# Patient Record
Sex: MALE | Race: WHITE | Employment: OTHER
[De-identification: names, ages, dates, MRNs, and addresses within clinical notes are randomized per-mention and may not be internally consistent; named-entity substitution may affect disease eponyms.]

---

## 2017-04-18 PROBLEM — R35.1 NOCTURIA: Status: ACTIVE | Noted: 2017-04-18

## 2018-04-10 PROBLEM — M79.2 NEUROPATHIC PAIN OF LEFT FOOT: Status: ACTIVE | Noted: 2018-04-10

## 2020-12-11 ENCOUNTER — TELEPHONE (OUTPATIENT)
Dept: RADIATION ONCOLOGY | Facility: MEDICAL CENTER | Age: 85
End: 2020-12-11

## 2020-12-11 NOTE — TELEPHONE ENCOUNTER
Received consult request from Dr Jovita Watson. Patient has [employer] and they are out of network. I notified Francisco Javier Lee at Dr Price Farris office.

## 2022-02-08 ENCOUNTER — TELEPHONE (OUTPATIENT)
Dept: GASTROENTEROLOGY | Age: 87
End: 2022-02-08

## 2022-02-08 DIAGNOSIS — K92.2 GASTROINTESTINAL HEMORRHAGE, UNSPECIFIED GASTROINTESTINAL HEMORRHAGE TYPE: Primary | ICD-10-CM

## 2022-02-08 NOTE — TELEPHONE ENCOUNTER
Writer called the patient's wife and let her doctor's recommendations. Writer will fax the labs to [facility] and will route this message to the Procedure Schedulers to schedule an EGD for the patient at [facility].

## 2022-02-08 NOTE — TELEPHONE ENCOUNTER
Patient's wife called and stated that the patient was in the hospital at [facility] [place], [place] on 1/27 - 1/30/22 due to Upper GI Bleed. Caller stated that an EGD was performed. Caller stated the results were seen on My Chart and it read that patient should repeat EGD. Caller wanted to know if the repeat EGD should be done before patient's next visit on 2/18/22. Writer stated that she will consult with doctor and get his recommendations and call her back. Caller verbalized understanding and thanked the writer.

## 2022-02-09 NOTE — TELEPHONE ENCOUNTER
Spoke with Dr Suman Persaud; he states there is no urgency for the EGD and pt can be scheduled next time he is at [facility]. Called pt; wife answered; pt is now scheduled for B[facility] Dr Suman Persaud 4/7/22 at 915am, egd, em, covid test 4/4/22 at 1130am. Reviewed prep instructions with patient over phone and mailed to home address.

## 2022-02-10 ENCOUNTER — OFFICE VISIT (OUTPATIENT)
Dept: GASTROENTEROLOGY | Age: 87
End: 2022-02-10
Payer: COMMERCIAL

## 2022-02-10 VITALS
DIASTOLIC BLOOD PRESSURE: 75 MMHG | HEART RATE: 62 BPM | TEMPERATURE: 96.7 F | SYSTOLIC BLOOD PRESSURE: 135 MMHG | OXYGEN SATURATION: 98 % | BODY MASS INDEX: 28.05 KG/M2 | WEIGHT: 179.1 LBS

## 2022-02-10 DIAGNOSIS — R10.84 ABDOMINAL PAIN, GENERALIZED: ICD-10-CM

## 2022-02-10 DIAGNOSIS — K31.7 GASTRIC POLYP: Primary | ICD-10-CM

## 2022-02-10 DIAGNOSIS — K59.00 CONSTIPATION, UNSPECIFIED CONSTIPATION TYPE: ICD-10-CM

## 2022-02-10 PROCEDURE — 99214 OFFICE O/P EST MOD 30 MIN: CPT | Performed by: INTERNAL MEDICINE

## 2022-02-10 ASSESSMENT — ENCOUNTER SYMPTOMS
ABDOMINAL DISTENTION: 0
CHOKING: 0
NAUSEA: 0
VOMITING: 0
COUGH: 0
TROUBLE SWALLOWING: 0
BLOOD IN STOOL: 0
CONSTIPATION: 0
SORE THROAT: 0
ANAL BLEEDING: 0
SHORTNESS OF BREATH: 0
VOICE CHANGE: 0
ABDOMINAL PAIN: 1
RECTAL PAIN: 0
BACK PAIN: 0
DIARRHEA: 0

## 2022-02-10 NOTE — PROGRESS NOTES
GI OFFICE FOLLOW UP    INTERVAL HISTORY:   No referring provider defined for this encounter. Chief Complaint   Patient presents with    Abdominal Pain     Patient is here today due to stomach pain       1. Gastric polyp    2. Constipation, unspecified constipation type    3. Abdominal pain, generalized              HISTORY OF PRESENT ILLNESS: Andreea Jurado is a 80 y.o. male with a past history remarkable for ,   Patient seen along with his wife. Apparently this patient was admitted but [facility] recently with dark stools. His hemoglobin is around 14 g and did not drop. Apparently patient was in the hospital for 3 to 4 days. He had a EGD done and was found to have a polypoid lesion, 1 cm in the body of the stomach which was biopsied. Histology revealed lymphocytic infiltration. Patient tolerating diet well. No nausea vomiting. No dysphagia. Has vague nonspecific upper abdominal discomfort. No radiation. Not related to food intake. No fever chills. Apparently patient had MRI of the abdomen done when he was in the hospital and was nonspecific. Patient states that his upper abdominal discomfort appears to be mostly when he sits for long time. When he is staying in the bed he does not have any symptoms. Past Medical,Family, and Social History reviewed and does contribute to the patient presenting condition. Patient's PMH/PSH,SH,PSYCH Hx, MEDs, ALLERGIES, and ROS were all reviewed and updated in the appropriate sections.  Yes      PAST MEDICAL HISTORY:  Past Medical History:   Diagnosis Date    Bilateral edema of lower extremity 1995    Bilateral hearing loss     wears hearing aids    Diverticulosis of sigmoid colon     Gout     History of bladder cancer 1980    Dr. Preet Villasenor yearly cysto    History of colon polyps 09/10/2012    History of hypokalemia  History of melanoma 2008    left cheek    History of prostate cancer 1992    S/P Prostatectomy    Hyperlipidemia     Hypertension     Tubular adenoma of colon 09/10/2012       Past Surgical History:   Procedure Laterality Date    COLONOSCOPY  2005    dr Joe Pierre COLONOSCOPY  09/10/2012    significant diverticulosis, tubular adenoma-sigmoid colon, small hemorrhoids    COLONOSCOPY  10/03/2016    significant diverticulosis sigmoid colon, small polyp rectum-hyperplastic    EYE SURGERY Bilateral 1994    cataracts removed    HERNIA REPAIR Right 1956    inguinal    HERNIA REPAIR Left 1995    inguinal    JOINT REPLACEMENT Right 1984    great toe joint D/T gout    KNEE ARTHROPLASTY Left 2000    meniscal repair    PROSTATE SURGERY  2002       CURRENT MEDICATIONS:    Current Outpatient Medications:     atorvastatin (LIPITOR) 20 MG tablet, Take 1 tablet by mouth daily, Disp: 90 tablet, Rfl: 1    gabapentin (NEURONTIN) 100 MG capsule, Take 1 capsule at night, Disp: 90 capsule, Rfl: 1    potassium chloride (KLOR-CON M) 20 MEQ extended release tablet, Take 1 tablet by mouth daily, Disp: 90 tablet, Rfl: 1    lisinopril (PRINIVIL;ZESTRIL) 40 MG tablet, Take 1 tablet by mouth daily, Disp: 90 tablet, Rfl: 1    allopurinol (ZYLOPRIM) 300 MG tablet, Take 1 tablet by mouth daily, Disp: 90 tablet, Rfl: 1    timolol (TIMOPTIC) 0.5 % ophthalmic solution, Place 1 drop into the left eye daily, Disp: 3 each, Rfl: 1    olopatadine (PATANASE) 0.6 % SOLN nassl soln, SPRAY 2 SPRAY(S) IN EACH NOSTRIL NIGHTLY, Disp: 3 each, Rfl: 1    hydroCHLOROthiazide (HYDRODIURIL) 25 MG tablet, Take 1 tablet by mouth daily, Disp: 90 tablet, Rfl: 1    ipratropium (ATROVENT) 0.03 % nasal spray, 2 sprays by Nasal route 3 times daily, Disp: 3 each, Rfl: 1    latanoprost (XALATAN) 0.005 % ophthalmic solution, INSTILL 1 DROP INTO EACH EYE NIGHTLY, Disp: 3 each, Rfl: 1    lidocaine 4 % external patch, Place 1 patch onto the skin daily, Disp: 30 patch, Rfl: 2    potassium chloride (KLOR-CON M) 10 MEQ extended release tablet, Take 1 tablet by mouth 2 times daily (Patient not taking: Reported on 1/31/2022), Disp: 30 tablet, Rfl: 0    magnesium oxide (MAG-OX) 400 MG tablet, Take 1 tablet by mouth daily, Disp: 30 tablet, Rfl: 0    meloxicam (MOBIC) 15 MG tablet, Take 1 tablet by mouth daily (Patient not taking: Reported on 1/31/2022), Disp: 90 tablet, Rfl: 1    tamsulosin (FLOMAX) 0.4 MG capsule, Take 1 capsule by mouth daily (Patient not taking: Reported on 7/7/2021), Disp: 90 capsule, Rfl: 3    ALLERGIES:   Allergies   Allergen Reactions    Mirabegron      Elevated blood pressure       FAMILY HISTORY:       Problem Relation Age of Onset    Heart Attack Mother     Heart Attack Father          SOCIAL HISTORY:   Social History     Socioeconomic History    Marital status:      Spouse name: Not on file    Number of children: Not on file    Years of education: Not on file    Highest education level: Not on file   Occupational History    Not on file   Tobacco Use    Smoking status: Never Smoker    Smokeless tobacco: Never Used   Vaping Use    Vaping Use: Never used   Substance and Sexual Activity    Alcohol use: Yes     Alcohol/week: 10.0 standard drinks     Types: 10 Shots of liquor per week    Drug use: No    Sexual activity: Not on file   Other Topics Concern    Not on file   Social History Narrative    Not on file     Social Determinants of Health     Financial Resource Strain: Low Risk     Difficulty of Paying Living Expenses: Not hard at all   Food Insecurity: No Food Insecurity    Worried About [address] in the Last Year: Never true    [address]  in the Last Year: Never true   Transportation Needs:     Lack of Transportation (Medical): Not on file    Lack of Transportation (Non-Medical):  Not on file   Physical Activity:     Days of Exercise per Week: Not on file    Minutes of Exercise per Session: Not on file   Stress:     Feeling of Stress : Not on file   Social Connections:     Frequency of Communication with Friends and Family: Not on file    Frequency of Social Gatherings with Friends and Family: Not on file    Attends [employer] Services: Not on file    Active Member of Clubs or Organizations: Not on file    Attends Club or Organization Meetings: Not on file    Marital Status: Not on file   Intimate Partner Violence:     Fear of Current or Ex-Partner: Not on file    Emotionally Abused: Not on file    Physically Abused: Not on file    Sexually Abused: Not on file   Housing Stability:     Unable to Pay for Housing in the Last Year: Not on file    Number of [place] in the Last Year: Not on file    Unstable Housing in the Last Year: Not on file         REVIEW OF SYSTEMS:         Review of Systems   Constitutional: Negative for appetite change, fatigue and unexpected weight change. HENT: Negative for dental problem, sore throat, trouble swallowing and voice change. Eyes: Negative for visual disturbance. Respiratory: Negative for cough, choking and shortness of breath. Cardiovascular: Negative for chest pain and leg swelling. Gastrointestinal: Positive for abdominal pain. Negative for abdominal distention, anal bleeding, blood in stool, constipation, diarrhea, nausea, rectal pain and vomiting. Genitourinary: Negative for difficulty urinating. Musculoskeletal: Positive for gait problem (uses a cane). Negative for back pain, joint swelling and myalgias. Neurological: Negative for dizziness, tremors, weakness, light-headedness, numbness and headaches. Hematological: Does not bruise/bleed easily (bruises). Psychiatric/Behavioral: Negative for sleep disturbance. The patient is not nervous/anxious. PHYSICAL EXAMINATION:     Vital signs reviewed per the nursing documentation.      /75   Pulse 62   Temp 96.7 °F (35.9 °C)   Wt 179 lb 1.6 oz (81.2 kg)   SpO2 98%   BMI 28.05 kg/m²   Body mass index is 28.05 kg/m². Physical Exam  Vitals reviewed. Constitutional:       Appearance: Normal appearance. HENT:      Head: Normocephalic and atraumatic. Eyes:      Extraocular Movements: Extraocular movements intact. Conjunctiva/sclera: Conjunctivae normal.   Cardiovascular:      Rate and Rhythm: Normal rate and regular rhythm. Heart sounds: Normal heart sounds. Pulmonary:      Effort: Pulmonary effort is normal.      Breath sounds: Normal breath sounds. Abdominal:      General: Bowel sounds are normal. There is no distension. Palpations: Abdomen is soft. There is no mass. Tenderness: There is no abdominal tenderness. There is no guarding. Hernia: No hernia is present. Genitourinary:     Rectum: Guaiac result negative. Skin:     General: Skin is warm and dry. Neurological:      General: No focal deficit present. Mental Status: He is alert and oriented to person, place, and time.    Psychiatric:         Mood and Affect: Mood normal.         Behavior: Behavior normal.           LABORATORY DATA: Reviewed  Lab Results   Component Value Date    WBC 8.1 07/23/2020    HGB 16.1 07/23/2020    HCT 46.9 07/23/2020    MCV 97 07/23/2020     07/23/2020     08/27/2021    K 3.7 08/27/2021    CL 99 08/27/2021    CO2 25 08/27/2021    BUN 15 08/27/2021    CREATININE 1.00 08/27/2021    LABALBU 3.6 08/27/2021    BILITOT 1.5 (A) 08/27/2021    ALKPHOS 173 08/27/2021    AST 18 08/27/2021    ALT 13 08/27/2021         Lab Results   Component Value Date    RBC 4.83 07/23/2020    HGB 16.1 07/23/2020    MCV 97 07/23/2020    MCH 33.3 07/23/2020    MCHC 34.2 07/23/2020    RDW 13.6 07/23/2020    MPV 8 07/23/2020    BASOPCT 0.0 07/23/2020    LYMPHSABS 1.2 07/23/2020    MONOSABS 0.8 07/23/2020    NEUTROABS 5.9 07/23/2020    EOSABS 0.2 07/23/2020    BASOSABS 0.0 07/23/2020         DIAGNOSTIC TESTING:     Op Note - Jacob Weir MD - 01/30/2022 11:03 AM EST  Formatting of this note might be different from the original.    PROCEDURE NOTE    DATE OF PROCEDURE: 1/30/2022     SURGEON: Colleen Sorenson MD    ASSISTANT: None    PREOPERATIVE DIAGNOSIS:   Melena    POSTOPERATIVE DIAGNOSIS:   1 small polyp in the upper stomach gentle biopsy was taken might need to be removed in a different occasion with a snare, has a little erosion on the tip of it measuring less than 1 cm    Mild gastritis    Significant duodenitis    OPERATION: Upper GI endoscopy with Biopsy    ANESTHESIA: Moderate Sedation     ESTIMATED BLOOD LOSS: Less than 50 ml    COMPLICATIONS: None. SPECIMENS: was obtained    HISTORY: The patient is a 80y.o. year old male with history of above preop diagnosis. I recommended esophagogastroduodenoscopy with possible biopsy and I explained the risk, benefits, expected outcome, and alternatives to the procedure. Risks included but are not limited to bleeding, infection, respiratory distress, hypotension, and perforation of the esophagus, stomach, or duodenum. Patient understands and is in agreement. Informed consent was obtained for the procedure, including sedation. Risks of perforation, hemorrhage, adverse drug reaction and aspiration were discussed. The patient was placed in the left lateral decubitus position. Based on the pre-procedure assessment, including review of the patient's medical history, medications, allergies, and review of systems, he had been deemed to be an appropriate candidate for conscious sedation; he was therefore sedated with the medications listed below. The patient was monitored continuously with ECG tracing, pulse oximetry, blood pressure monitoring, and direct observations. PROCEDURE: The patient was given IV conscious sedation. The patient's SPO2 remained above 90% throughout the procedure.  The gastroscope was inserted orally and advanced under direct vision through the esophagus, through the stomach, through the pylorus, and into the descending duodenum. Findings:    Retropharyngeal area was grossly normal appearing    Esophagus: normal    Stomach:  Fundus: normal  Body: abnormal: 1 small polyp in the upper stomach gentle biopsy was taken might need to be removed in a different occasion with a snare, has a little erosion on the tip of it measuring less than 1 cm    Antrum:Mild gastritis    Duodenum:   Descending: normal  Bulb: abnormal: Significant duodenitis    The scope was removed and the patient tolerated the procedure well. Recommendations/Plan:   1. F/U Biopsies  2. PPI  3. Ok to d/c and follow out pt  4. will consider out pt colonoscopy     Electronically signed by Anayeli Draper MD on 2022 at 11:19 AM     Electronically signed by Anayeli Draper MD at 2022 11:20 AM EST      Narrative  Performed by [facility]   [facility]        [address] [place] [address]   Tel: [phone]         Fax: [phone]     [address] [place] [address]   Surgical Pathology Consultation            Patient Name: Jim ROSARIOGaye BUNCH : 9/3/1933 (Age: 80) Gender: M Taken: 2022 Reported: 2022 Physician(s): Anayeli Draper MD ([phone]) Copy To:  Accession #: [id number] [facility]. Rec. #: [id number] Acct: # [de-identified]   Final Pathologic Diagnosis   Upper stomach polyp, biopsy:        Fragments of unremarkable gastric mucosa with focal atypical intramucosal and submucosal B-cell             predominant lymphoid infiltrate (see comments).      No Helicobacter pylori organisms seen on H&E or immunostain. Comment: Sections show fragments of benign gastric mucosa with foci of submucosal and intramucosal lymphoid infiltrate composed predominantly of small uniform lymphocytes. Immunostains are performed with appropriate controls. The lymphoid infiltrate   shows diffuse positive staining for CD20. CD3, CD5 and CD43 stain background T-cells. CD10 and BCL 6 and are negative.  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 Repeat biopsy with flow cytometric analysis is   suggested.              **Report Electronically Signed Out**   nxk/2/2/2022 Talita Watts MD         Interpretation performed at [facility], [address] [facility], [place], [facility], License number: [id number]. Clinical History   GI bleed. Gross Description   Received in formalin labeled \"UHRMAN, upper stomach polyp\" are two tan soft tissue bits 0.4 and 0.6 cm. The specimen is filtered and entirely submitted in a single cassette. (1, ns, [id number],m8) KS       ks/1/31/2022 SSI         Specimen(s) Received    Upper stomach polyp     Fee Codes(s):   1; [id number], 74016, 40094(6)  Specimen Collected: 01/30/22 11:13 AM Last Resulted: 02/02/22 11:02 AM   Received From: [facility]  Result Received: 02/08/22  2:20 PM                   Assessment  1. Gastric polyp    2. Constipation, unspecified constipation type    3. Abdominal pain, generalized        Plan  Discussed with the patient and wife regarding benign abdomen status. Explained regarding vague nonspecific pain, may be referred pain from his back. Advised precautions to avoid constipation. Apparently with good bowel movements patient feels good. Discussed regarding medical management of constipation    Explained to the patient and patient's wife regarding EGD findings. Patient's wife wants the polyp to be removed from the stomach. For this reason he may need EGD and this will be arranged. In between if he has any issues to contact me. Thank you for allowing me to participate in the care of Mr. Olu Lala. For any further questions please do not hesitate to contact me. I have reviewed and agree with the ROS entered by the MA/LPN. Note is dictated utilizing voice recognition software. Unfortunately this leads to occasional typographical errors.  Please contact our office if you have any questions        Nadia General, MD,FACP, [facility]  Board Certified in Gastroenterology and [address] [place] [address] Gastroenterology  Office #: [phone]

## 2022-04-15 ENCOUNTER — OFFICE VISIT (OUTPATIENT)
Dept: GASTROENTEROLOGY | Age: 87
End: 2022-04-15
Payer: COMMERCIAL

## 2022-04-15 ENCOUNTER — TELEPHONE (OUTPATIENT)
Dept: GASTROENTEROLOGY | Age: 87
End: 2022-04-15

## 2022-04-15 VITALS
OXYGEN SATURATION: 97 % | SYSTOLIC BLOOD PRESSURE: 110 MMHG | WEIGHT: 188.7 LBS | HEIGHT: 67 IN | DIASTOLIC BLOOD PRESSURE: 60 MMHG | BODY MASS INDEX: 29.62 KG/M2 | HEART RATE: 57 BPM

## 2022-04-15 DIAGNOSIS — C88.4 MALT LYMPHOMA (HCC): Primary | ICD-10-CM

## 2022-04-15 PROCEDURE — 99214 OFFICE O/P EST MOD 30 MIN: CPT | Performed by: INTERNAL MEDICINE

## 2022-04-15 ASSESSMENT — ENCOUNTER SYMPTOMS
RECTAL PAIN: 0
DIARRHEA: 0
SORE THROAT: 0
CHOKING: 0
VOMITING: 0
CONSTIPATION: 0
VOICE CHANGE: 0
SHORTNESS OF BREATH: 0
NAUSEA: 0
ANAL BLEEDING: 0
TROUBLE SWALLOWING: 0
ABDOMINAL DISTENTION: 0
ABDOMINAL PAIN: 0
COUGH: 0
BLOOD IN STOOL: 0
BACK PAIN: 0

## 2022-04-15 NOTE — TELEPHONE ENCOUNTER
Per Dr Karla Watt, pt needs referral to Dr Cheyenne Rowland office in [place] for [address] of stomach. Called Dr Cheyenne Rowland office and spoke to Sanchez Maddox. She states Dr Cricket Aguilar is scheduling 4 months out for for new pt's at the [place] location. Pt was offered to be scheduled at [facility] [place] or [email or web address] with Dr Cricket Aguilar, pt declined. Therefore, pt scheduled with Dr Eleonora Chang at [address] [place] [address] on 4/19/22 at 1pm.     Per Sanchez Maddox, they need any pathology, CT results, labs, progress notes and EGD results faxed to their office at [phone]. Clinical staff, please send requested info and referral to Dr Eleonora Chang to Donna's attention.

## 2022-04-15 NOTE — PROGRESS NOTES
GI OFFICE FOLLOW UP    INTERVAL HISTORY:   No referring provider defined for this encounter. No chief complaint on file. No diagnosis found. HISTORY OF PRESENT ILLNESS:   Patient seen along with his wife. Used to have vague upper abdominal discomfort. Following his EGD the abdominal discomfort completely resolved. He is tolerating diet well. No nausea vomiting. No weight loss. No fever chills. Bowel movements satisfactory. EGD revealed prominent fold in the upper part of the stomach below cardia. Extensive biopsies from this area revealed nodular infiltrate of atypical lymphocytes suggestive of MALT lymphoma. 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 H. pylori negative. Past Medical,Family, and Social History reviewed and does contribute to the patient presenting condition. Patient's PMH/PSH,SH,PSYCH Hx, MEDs, ALLERGIES, and ROS were all reviewed and updated in the appropriate sections.  Yes      PAST MEDICAL HISTORY:  Past Medical History:   Diagnosis Date    Bilateral edema of lower extremity 1995    Bilateral hearing loss     wears hearing aids    Diverticulosis of sigmoid colon     Gout     History of bladder cancer 1980    Dr. Ary Burris yearly cysto    History of colon polyps 09/10/2012    History of hypokalemia     History of melanoma 2008    left cheek    History of prostate cancer 1992    S/P Prostatectomy    Hyperlipidemia     Hypertension     Tubular adenoma of colon 09/10/2012       Past Surgical History:   Procedure Laterality Date    COLONOSCOPY  2005    dr Federico Chatterjee  09/10/2012    significant diverticulosis, tubular adenoma-sigmoid colon, small hemorrhoids    COLONOSCOPY  10/03/2016    significant diverticulosis sigmoid colon, small polyp rectum-hyperplastic    EYE SURGERY Bilateral 1994    cataracts removed    HERNIA REPAIR Right 1956    inguinal    HERNIA REPAIR Left 1995    inguinal    JOINT REPLACEMENT Right 1984    great toe joint D/T gout    KNEE ARTHROPLASTY Left 2000    meniscal repair    PROSTATE SURGERY  2002       CURRENT MEDICATIONS:    Current Outpatient Medications:     pantoprazole (PROTONIX) 40 MG tablet, Take 1 tablet by mouth daily, Disp: 90 tablet, Rfl: 1    magnesium oxide (MAG-OX) 400 MG tablet, Take 1 tablet by mouth daily, Disp: 90 tablet, Rfl: 1    amLODIPine (NORVASC) 5 MG tablet, TAKE 1 TABLET BY MOUTH ONCE DAILY, Disp: 90 tablet, Rfl: 1    latanoprost (XALATAN) 0.005 % ophthalmic solution, INSTILL 1 DROP INTO EACH EYE NIGHTLY, Disp: 3 each, Rfl: 1    atorvastatin (LIPITOR) 20 MG tablet, Take 1 tablet by mouth daily, Disp: 90 tablet, Rfl: 1    gabapentin (NEURONTIN) 100 MG capsule, Take 1 capsule at night, Disp: 90 capsule, Rfl: 1    potassium chloride (KLOR-CON M) 20 MEQ extended release tablet, Take 1 tablet by mouth daily, Disp: 90 tablet, Rfl: 1    lisinopril (PRINIVIL;ZESTRIL) 40 MG tablet, Take 1 tablet by mouth daily, Disp: 90 tablet, Rfl: 1    allopurinol (ZYLOPRIM) 300 MG tablet, Take 1 tablet by mouth daily, Disp: 90 tablet, Rfl: 1    timolol (TIMOPTIC) 0.5 % ophthalmic solution, Place 1 drop into the left eye daily, Disp: 3 each, Rfl: 1    olopatadine (PATANASE) 0.6 % SOLN nassl soln, SPRAY 2 SPRAY(S) IN EACH NOSTRIL NIGHTLY, Disp: 3 each, Rfl: 1    hydroCHLOROthiazide (HYDRODIURIL) 25 MG tablet, Take 1 tablet by mouth daily, Disp: 90 tablet, Rfl: 1    ipratropium (ATROVENT) 0.03 % nasal spray, 2 sprays by Nasal route 3 times daily, Disp: 3 each, Rfl: 1    lidocaine 4 % external patch, Place 1 patch onto the skin daily, Disp: 30 patch, Rfl: 2    potassium chloride (KLOR-CON M) 10 MEQ extended release tablet, Take 1 tablet by mouth 2 times daily, Disp: 30 tablet, Rfl: 0   meloxicam (MOBIC) 15 MG tablet, Take 1 tablet by mouth daily, Disp: 90 tablet, Rfl: 1    tamsulosin (FLOMAX) 0.4 MG capsule, Take 1 capsule by mouth daily (Patient not taking: Reported on 7/7/2021), Disp: 90 capsule, Rfl: 3    ALLERGIES:   Allergies   Allergen Reactions    Mirabegron      Elevated blood pressure       FAMILY HISTORY:       Problem Relation Age of Onset    Heart Attack Mother     Heart Attack Father          SOCIAL HISTORY:   Social History     Socioeconomic History    Marital status:      Spouse name: Not on file    Number of children: Not on file    Years of education: Not on file    Highest education level: Not on file   Occupational History    Not on file   Tobacco Use    Smoking status: Never Smoker    Smokeless tobacco: Never Used   Vaping Use    Vaping Use: Never used   Substance and Sexual Activity    Alcohol use: Yes     Alcohol/week: 10.0 standard drinks     Types: 10 Shots of liquor per week    Drug use: No    Sexual activity: Not on file   Other Topics Concern    Not on file   Social History Narrative    Not on file     Social Determinants of Health     Financial Resource Strain: Low Risk     Difficulty of Paying Living Expenses: Not hard at all   Food Insecurity: No Food Insecurity    Worried About [facility] in the Last Year: Never true    [facility] in the Last Year: Never true   Transportation Needs:     Lack of Transportation (Medical): Not on file    Lack of Transportation (Non-Medical):  Not on file   Physical Activity:     Days of Exercise per Week: Not on file    Minutes of Exercise per Session: Not on file   Stress:     Feeling of Stress : Not on file   Social Connections:     Frequency of Communication with Friends and Family: Not on file    Frequency of Social Gatherings with Friends and Family: Not on file    Attends [employer] Services: Not on file    Active Member of Clubs or Organizations: Not on file    Attends Club or Organization Meetings: Not on file    Marital Status: Not on file   Intimate Partner Violence:     Fear of Current or Ex-Partner: Not on file    Emotionally Abused: Not on file    Physically Abused: Not on file    Sexually Abused: Not on file   Housing Stability:     Unable to Pay for Housing in the Last Year: Not on file    Number of [place] in the Last Year: Not on file    Unstable Housing in the Last Year: Not on file         REVIEW OF SYSTEMS:         Review of Systems   Constitutional: Negative for appetite change, fatigue and unexpected weight change. HENT: Negative for dental problem, sore throat, trouble swallowing and voice change. Eyes: Negative for visual disturbance. Respiratory: Negative for cough, choking and shortness of breath. Cardiovascular: Negative for chest pain and leg swelling. Gastrointestinal: Negative for abdominal distention, abdominal pain, anal bleeding, blood in stool, constipation, diarrhea, nausea, rectal pain and vomiting. Genitourinary: Negative for difficulty urinating. Musculoskeletal: Positive for gait problem (uses a cane). Negative for back pain, joint swelling and myalgias. Neurological: Negative for dizziness, tremors, weakness, light-headedness, numbness and headaches. Hematological: Does not bruise/bleed easily (bruises). Psychiatric/Behavioral: Negative for sleep disturbance. The patient is not nervous/anxious. PHYSICAL EXAMINATION:     Vital signs reviewed per the nursing documentation. There were no vitals taken for this visit. There is no height or weight on file to calculate BMI. Physical Exam  Vitals reviewed. Constitutional:       Appearance: Normal appearance. Comments: Elderly patient appears stable. HENT:      Head: Normocephalic and atraumatic. Eyes:      Extraocular Movements: Extraocular movements intact.       Conjunctiva/sclera: Conjunctivae normal.   Cardiovascular:      Rate and Rhythm: Normal rate and regular rhythm. Heart sounds: Normal heart sounds. Pulmonary:      Effort: Pulmonary effort is normal.      Breath sounds: Normal breath sounds. Abdominal:      General: Bowel sounds are normal. There is no distension. Palpations: Abdomen is soft. There is no mass. Tenderness: There is no abdominal tenderness. There is no guarding. Hernia: No hernia is present. Skin:     General: Skin is warm and dry. Neurological:      General: No focal deficit present. Mental Status: He is alert and oriented to person, place, and time. Psychiatric:         Mood and Affect: Mood normal.         Behavior: Behavior normal.           LABORATORY DATA: Reviewed  Lab Results   Component Value Date    WBC 8.1 07/23/2020    HGB 16.1 07/23/2020    HCT 46.9 07/23/2020    MCV 97 07/23/2020     07/23/2020     08/27/2021    K 3.7 08/27/2021    CL 99 08/27/2021    CO2 25 08/27/2021    BUN 15 08/27/2021    CREATININE 1.00 08/27/2021    LABALBU 3.6 08/27/2021    BILITOT 1.5 (A) 08/27/2021    ALKPHOS 173 08/27/2021    AST 18 08/27/2021    ALT 13 08/27/2021         Lab Results   Component Value Date    RBC 4.83 07/23/2020    HGB 16.1 07/23/2020    MCV 97 07/23/2020    MCH 33.3 07/23/2020    MCHC 34.2 07/23/2020    RDW 13.6 07/23/2020    MPV 8 07/23/2020    BASOPCT 0.0 07/23/2020    LYMPHSABS 1.2 07/23/2020    MONOSABS 0.8 07/23/2020    NEUTROABS 5.9 07/23/2020    EOSABS 0.2 07/23/2020    BASOSABS 0.0 07/23/2020         DIAGNOSTIC TESTING:     Op Note - Lissy Baires MD - 04/07/2022 9:38 AM EDT  Formatting of this note might be different from the original.    PROCEDURE NOTE    DATE OF PROCEDURE: 4/7/2022     SURGEON: Lissy Baires MD    ASSISTANT: None    PREOPERATIVE DIAGNOSIS: History of questionable polyp in the stomach. Patient was advised to have EGD to remove the polyp by different physician.     POSTOPERATIVE DIAGNOSIS: Prominent afford in the upper part of stomach about couple of cm below the cardia towards the lesser curvature. No clear-cut polypoid lesion noted. OPERATION: Upper GI endoscopy with Biopsy    ANESTHESIA: Moderate Sedation     ESTIMATED BLOOD LOSS: Less than 50 ml    COMPLICATIONS: None. SPECIMENS: was obtained    HISTORY: The patient is a 80y.o. year old male with history of above preop diagnosis. I recommended esophagogastroduodenoscopy with possible biopsy and I explained the risk, benefits, expected outcome, and alternatives to the procedure. Risks included but are not limited to bleeding, infection, respiratory distress, hypotension, and perforation of the esophagus, stomach, or duodenum. Patient understands and is in agreement. Informed consent was obtained for the procedure, including sedation. Risks of perforation, hemorrhage, adverse drug reaction and aspiration were discussed. The patient was placed in the left lateral decubitus position. Based on the pre-procedure assessment, including review of the patient's medical history, medications, allergies, and review of systems, he had been deemed to be an appropriate candidate for conscious sedation; he was therefore sedated with the medications listed below. The patient was monitored continuously with ECG tracing, pulse oximetry, blood pressure monitoring, and direct observations. PROCEDURE: The patient was given IV conscious sedation. The patient's SPO2 remained above 90% throughout the procedure. The gastroscope was inserted orally and advanced under direct vision through the esophagus, through the stomach, through the pylorus, and into the descending duodenum. Findings:    Retropharyngeal area was grossly normal appearing    Esophagus: normal    Stomach:  Fundus: abnormal: Questionable prominent fold noted in the upper part of the stomach towards the cardia, 2 cm below the GE junction. Multiple biopsies taken from this area. No clear-cut polyp noted in this area.   Body: normal  Antrum: normal    Duodenum:   Descending: normal  Bulb: normal    The scope was removed and the patient tolerated the procedure well. Patient noted to have atrial fibrillation. Discussed with the cardiologist prior to the procedure. Cardiologist like to see him in the next couple of days in the office. Discussed with the patient family regarding this and make an appointment. Recommendations/Plan:   1. F/U Biopsies  2. F/U In Office in 3-4 weeks  3. Discussed with the family    Electronically signed by Chema Moore MD on 2022 at 10:02 AM   Electronically signed by Chema Moore MD at 2022 10:05 AM EDT    Narrative  Performed by [facility]        [facility]        [address] [place] [address]   Jag Chacon   Tel: [phone]         Fax: [phone]     [facility]   Surgical Pathology Consultation                Patient Linda Murillo SR.:9/3/1933 (Age: 88)Gender:MTaken:2Reported:2Physician(s):Rehan Thomas MD ([phone])ZWKV To: Accession #:[id number]. Rec. 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 Note: There is a nodular infiltrate of atypical lymphocytes with small uniform cells that infiltrate muscularis mucosa. This neoplastic cells invade surrounding gastric epithelium to form the lymphoepithelial lesions. Immunohistochemical stains are   performed. Neoplastic lymphocytes are positive for CD20, negative for CD3, CD43, CD5, and CD10. Immunostain for H. pylori is negative. The pattern of immunohistochemical staining is supportive of above diagnosis. This case is also reviewed   intra-departmentally. Assessment  No diagnosis found.     Plan  Given patient's age, comorbidities, asymptomatic status, I am not sure whether he will need further aggressive therapy at this time. Will refer to oncology consultation for their opinion. Patient and patient's wife had several questions answered to their understanding    Patient is reassured  Advised to contact me following oncology consultation. Thank you for allowing me to participate in the care of Mr. Justino Garrido. For any further questions please do not hesitate to contact me. I have reviewed and agree with the ROS entered by the MA/LPN. Note is dictated utilizing voice recognition software. Unfortunately this leads to occasional typographical errors.  Please contact our office if you have any questions        Nina Carvajal MD,FACP, [facility]  Board Certified in Gastroenterology and [address] [place] [address] Gastroenterology  Office #: [phone]

## 2022-04-19 ENCOUNTER — INITIAL CONSULT (OUTPATIENT)
Dept: ONCOLOGY | Age: 87
End: 2022-04-19
Payer: COMMERCIAL

## 2022-04-19 ENCOUNTER — HOSPITAL ENCOUNTER (OUTPATIENT)
Age: 87
Discharge: HOME | End: 2022-04-19
Payer: COMMERCIAL

## 2022-04-19 ENCOUNTER — TELEPHONE (OUTPATIENT)
Dept: ONCOLOGY | Age: 87
End: 2022-04-19

## 2022-04-19 VITALS
DIASTOLIC BLOOD PRESSURE: 75 MMHG | WEIGHT: 187.2 LBS | SYSTOLIC BLOOD PRESSURE: 118 MMHG | HEART RATE: 54 BPM | TEMPERATURE: 97.3 F | BODY MASS INDEX: 29.32 KG/M2

## 2022-04-19 DIAGNOSIS — C88.4 EXTRANODAL MARGINAL ZONE LYMPHOMA OF MUCOSA-ASSOCIATED LYMPHOID TISSUE (MALT) OF STOMACH (HCC): ICD-10-CM

## 2022-04-19 DIAGNOSIS — Z85.820 HISTORY OF MELANOMA: ICD-10-CM

## 2022-04-19 DIAGNOSIS — Z85.46 HISTORY OF PROSTATE CANCER: Primary | ICD-10-CM

## 2022-04-19 DIAGNOSIS — Z85.51 HISTORY OF BLADDER CANCER: ICD-10-CM

## 2022-04-19 PROBLEM — C88.40 EXTRANODAL MARGINAL ZONE LYMPHOMA OF MUCOSA-ASSOCIATED LYMPHOID TISSUE (MALT) OF STOMACH: Status: ACTIVE | Noted: 2022-04-19

## 2022-04-19 LAB
ABSOLUTE EOS #: 0.3 K/UL (ref 0–0.4)
ABSOLUTE LYMPH #: 1 K/UL (ref 1–4.8)
ABSOLUTE MONO #: 1.1 K/UL (ref 0.1–1.3)
ALBUMIN SERPL-MCNC: 4.2 G/DL (ref 3.5–5.2)
ALP BLD-CCNC: 182 U/L (ref 40–129)
ALT SERPL-CCNC: 12 U/L (ref 5–41)
ANION GAP SERPL CALCULATED.3IONS-SCNC: 11 MMOL/L (ref 9–17)
AST SERPL-CCNC: 20 U/L
BASOPHILS # BLD: 1 % (ref 0–2)
BASOPHILS ABSOLUTE: 0.1 K/UL (ref 0–0.2)
BILIRUB SERPL-MCNC: 1.12 MG/DL (ref 0.3–1.2)
BUN BLDV-MCNC: 28 MG/DL (ref 8–23)
CALCIUM SERPL-MCNC: 9.3 MG/DL (ref 8.6–10.4)
CHLORIDE BLD-SCNC: 102 MMOL/L (ref 98–107)
CO2: 26 MMOL/L (ref 20–31)
CREAT SERPL-MCNC: 1.31 MG/DL (ref 0.7–1.2)
EOSINOPHILS RELATIVE PERCENT: 4 % (ref 0–4)
GFR AFRICAN AMERICAN: >60 ML/MIN
GFR NON-AFRICAN AMERICAN: 52 ML/MIN
GFR SERPL CREATININE-BSD FRML MDRD: ABNORMAL ML/MIN/{1.73_M2}
GLUCOSE BLD-MCNC: 90 MG/DL (ref 70–99)
HCT VFR BLD CALC: 41.2 % (ref 41–53)
HEMOGLOBIN: 13.8 G/DL (ref 13.5–17.5)
LACTATE DEHYDROGENASE: 175 U/L (ref 135–225)
LYMPHOCYTES # BLD: 11 % (ref 24–44)
MCH RBC QN AUTO: 31.4 PG (ref 26–34)
MCHC RBC AUTO-ENTMCNC: 33.6 G/DL (ref 31–37)
MCV RBC AUTO: 93.5 FL (ref 80–100)
MONOCYTES # BLD: 12 % (ref 1–7)
PDW BLD-RTO: 14.4 % (ref 11.5–14.9)
PLATELET # BLD: 211 K/UL (ref 150–450)
PMV BLD AUTO: 8 FL (ref 6–12)
POTASSIUM SERPL-SCNC: 4.4 MMOL/L (ref 3.7–5.3)
RBC # BLD: 4.4 M/UL (ref 4.5–5.9)
SEG NEUTROPHILS: 72 % (ref 36–66)
SEGMENTED NEUTROPHILS ABSOLUTE COUNT: 6.5 K/UL (ref 1.3–9.1)
SODIUM BLD-SCNC: 139 MMOL/L (ref 135–144)
TOTAL PROTEIN: 6.4 G/DL (ref 6.4–8.3)
WBC # BLD: 8.9 K/UL (ref 3.5–11)

## 2022-04-19 PROCEDURE — 85025 COMPLETE CBC W/AUTO DIFF WBC: CPT

## 2022-04-19 PROCEDURE — 83615 LACTATE (LD) (LDH) ENZYME: CPT

## 2022-04-19 PROCEDURE — 80053 COMPREHEN METABOLIC PANEL: CPT

## 2022-04-19 PROCEDURE — 36415 COLL VENOUS BLD VENIPUNCTURE: CPT

## 2022-04-19 PROCEDURE — 99215 OFFICE O/P EST HI 40 MIN: CPT | Performed by: INTERNAL MEDICINE

## 2022-04-19 NOTE — PROGRESS NOTES
Patient ID: Magy Bee, 9/3/1933, [id number], 80 y.o.   Referred by :  Crow Lux MD    Reason for consultation: Gastric marginal zone lymphoma      HISTORY OF PRESENT ILLNESS:    Oncologic History:    Magy Bee is a very pleasant 80 y.o. 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 Godfrey Hippo yearly cysto    History of colon polyps 09/10/2012    History of hypokalemia     History of melanoma 2008    left cheek    History of prostate cancer 1992    S/P Prostatectomy    Hyperlipidemia     Hypertension     Tubular adenoma of colon 09/10/2012       Past Surgical History:   Procedure Laterality Date    COLONOSCOPY  2005    dr Pattie Javier COLONOSCOPY  09/10/2012    significant diverticulosis, tubular adenoma-sigmoid colon, small hemorrhoids    COLONOSCOPY  10/03/2016    significant diverticulosis sigmoid colon, small polyp rectum-hyperplastic    EYE SURGERY Bilateral 1994    cataracts removed    HERNIA REPAIR Right 1956    inguinal    HERNIA REPAIR Left 1995    inguinal    JOINT REPLACEMENT Right 1984    great toe joint D/T gout    KNEE ARTHROPLASTY Left 2000    meniscal repair    PROSTATE SURGERY  2002       Allergies   Allergen Reactions    Mirabegron      Elevated blood pressure       Current Outpatient Medications   Medication Sig Dispense Refill    pantoprazole (PROTONIX) 40 MG tablet Take 1 tablet by mouth daily 90 tablet 1    magnesium oxide (MAG-OX) 400 MG tablet Take 1 tablet by mouth daily 90 tablet 1    amLODIPine (NORVASC) 5 MG tablet TAKE 1 TABLET BY MOUTH ONCE DAILY 90 tablet 1    latanoprost (XALATAN) 0.005 % ophthalmic solution INSTILL 1 DROP INTO EACH EYE NIGHTLY 3 each 1    atorvastatin (LIPITOR) 20 MG tablet Take 1 tablet by mouth daily 90 tablet 1    gabapentin (NEURONTIN) 100 MG capsule Take 1 capsule at night 90 capsule 1    potassium chloride (KLOR-CON M) 20 MEQ extended release tablet Take 1 tablet by mouth daily 90 tablet 1    lisinopril (PRINIVIL;ZESTRIL) 40 MG tablet Take 1 tablet by mouth daily 90 tablet 1    allopurinol (ZYLOPRIM) 300 MG tablet Take 1 tablet by mouth daily 90 tablet 1    timolol (TIMOPTIC) 0.5 % ophthalmic solution Place 1 drop into the left eye daily 3 each 1    olopatadine (PATANASE) 0.6 % SOLN nassl soln SPRAY 2 SPRAY(S) IN EACH NOSTRIL NIGHTLY 3 each 1    hydroCHLOROthiazide (HYDRODIURIL) 25 MG tablet Take 1 tablet by mouth daily 90 tablet 1    ipratropium (ATROVENT) 0.03 % nasal spray 2 sprays by Nasal route 3 times daily 3 each 1    lidocaine 4 % external patch Place 1 patch onto the skin daily 30 patch 2    potassium chloride (KLOR-CON M) 10 MEQ extended release tablet Take 1 tablet by mouth 2 times daily 30 tablet 0    meloxicam (MOBIC) 15 MG tablet Take 1 tablet by mouth daily 90 tablet 1    tamsulosin (FLOMAX) 0.4 MG capsule Take 1 capsule by mouth daily (Patient not taking: Reported on 7/7/2021) 90 capsule 3     No current facility-administered medications for this visit. Social History     Socioeconomic History    Marital status:      Spouse name: Not on file    Number of children: Not on file    Years of education: Not on file    Highest education level: Not on file   Occupational History    Not on file   Tobacco Use    Smoking status: Never Smoker    Smokeless tobacco: Never Used   Vaping Use    Vaping Use: Never used   Substance and Sexual Activity    Alcohol use: Yes     Alcohol/week: 10.0 standard drinks     Types: 10 Shots of liquor per week    Drug use: No    Sexual activity: Not on file   Other Topics Concern    Not on file   Social History Narrative    Not on file     Social Determinants of Health     Financial Resource Strain: Low Risk     Difficulty of Paying Living Expenses: Not hard at all   Food Insecurity: No Food Insecurity    Worried About [address] in the Last Year: Never true    [address] [place] in the Last Year: Never true   Transportation Needs:     Lack of Transportation (Medical): Not on file    Lack of Transportation (Non-Medical):  Not on file   Physical Activity:     Days of Exercise per Week: Not on file    Minutes of Exercise per Session: Not on file   Stress:     Feeling of Stress : Not on file   Social Connections:     Frequency of Communication with Friends and Family: Not on file    Frequency of Social Gatherings with Friends and Family: Not on file    Attends Gnosticism Services: Not on file    Active Member of Clubs or Organizations: Not on file    Attends Club or Organization Meetings: Not on file    Marital Status: Not on file   Intimate Partner Violence:     Fear of Current or Ex-Partner: Not on file    Emotionally Abused: Not on file    Physically Abused: Not on file    Sexually Abused: Not on file   Housing Stability:     Unable to Pay for Housing in the Last Year: Not on file    Number of Places Lived in the Last Year: Not on file    Unstable Housing in the Last Year: Not on file       Family History   Problem Relation Age of Onset    Heart Attack Mother     Heart Attack Father         REVIEW OF SYSTEM:     Constitutional: No fever or chills. No night sweats, no weight loss   Eyes: No eye discharge, double vision, or eye pain   HEENT: negative for sore mouth, sore throat, hoarseness and voice change   Respiratory: negative for cough , sputum, dyspnea, wheezing, hemoptysis, chest pain   Cardiovascular: negative for chest pain, dyspnea, palpitations, orthopnea, PND   Gastrointestinal: negative for nausea, vomiting, diarrhea, constipation, abdominal pain, Dysphagia, hematemesis and hematochezia   Genitourinary: negative for frequency, dysuria, nocturia, urinary incontinence, and hematuria   Integument: negative for rash, skin lesions, bruises.    Hematologic/Lymphatic: negative for easy bruising, bleeding, lymphadenopathy, petechiae and swelling/edema   Endocrine: negative for heat or cold intolerance, tremor, weight changes, change in bowel habits and hair loss   Musculoskeletal: negative for myalgias, arthralgias, pain, joint swelling,and bone pain   Neurological: negative for headaches, dizziness, seizures, weakness, numbness       OBJECTIVE:         Vitals:    04/19/22 1307   BP: 118/75   Pulse: 54   Temp: 97.3 °F (36.3 °C)       PHYSICAL EXAM:   General appearance - well appearing, no in pain or distress   Mental status - alert and cooperative   Eyes - pupils equal and reactive, extraocular eye movements intact   Ears - bilateral TM's and external ear canals normal   Mouth - mucous membranes moist, pharynx normal without lesions   Neck - supple, no significant adenopathy   Lymphatics - no palpable lymphadenopathy, no hepatosplenomegaly   Chest - clear to auscultation, no wheezes, rales or rhonchi, symmetric air entry   Heart - normal rate, regular rhythm, normal S1, S2, no murmurs, rubs, clicks or gallops   Abdomen - soft, nontender, nondistended, no masses or organomegaly   Neurological - alert, oriented, normal speech, no focal findings or movement disorder noted   Musculoskeletal - no joint tenderness, deformity or swelling   Extremities - peripheral pulses normal, no pedal edema, no clubbing or cyanosis   Skin - normal coloration and turgor, no rashes, no suspicious skin lesions noted ,      LABORATORY DATA:     Lab Results   Component Value Date    WBC 8.1 07/23/2020    HGB 16.1 07/23/2020    HCT 46.9 07/23/2020    MCV 97 07/23/2020     07/23/2020    LYMPHOPCT 14.3 07/23/2020    RBC 4.83 07/23/2020    MCH 33.3 07/23/2020    MCHC 34.2 07/23/2020    RDW 13.6 07/23/2020    NEUTOPHILPCT 72.9 07/23/2020    MONOPCT 0.8 07/23/2020    EOSPCT 0.2 07/23/2020    BASOPCT 0.0 07/23/2020    NEUTROABS 5.9 07/23/2020    LYMPHSABS 1.2 07/23/2020    MONOSABS 0.8 07/23/2020    EOSABS 0.2 07/23/2020    BASOSABS 0.0 07/23/2020         Chemistry        Component Value Date/Time     08/27/2021 0000    K 3.7 08/27/2021 0000    CL 99 08/27/2021 0000    CO2 25 08/27/2021 0000    BUN 15 08/27/2021 0000    CREATININE 1.00 08/27/2021 0000        Component Value Date/Time    CALCIUM 8.8 08/27/2021 0000    ALKPHOS 173 08/27/2021 0000    AST 18 08/27/2021 0000    ALT 13 08/27/2021 0000    BILITOT 1.5 (A) 08/27/2021 0000            PATHOLOGY DATA:         IMAGING DATA:      No image results found. ASSESSMENT:       Diagnosis Orders   1. History of prostate cancer     2. History of bladder cancer     3. History of melanoma     4. Extranodal marginal zone lymphoma of mucosa-associated lymphoid tissue (MALT) of stomach (HCC)  PET  FULL BODY    CBC with Auto Differential    Comprehensive Metabolic Panel    Lactate Dehydrogenase        1. Gastric MALT lymphoma  2. Multiple comorbidity  3.  Remote history of bladder cancer prostate cancer and recently angiosarcoma of the right cheek surgery done at [facility] with no evidence of recurrence    PLAN:     1. Reviewed lab work, imaging studies, outside records and discuss possible diagnosis and treatment recommendations  2. Patient had stage I gastric MALT lymphoma based on the imaging with no lymphadenopathy, explained to the patient that this is indolent lymphoma and the standard of care would be observation/surveillance(for stage II or above) unless had symptoms or indication for treatment but with stage I there is a option of cure with ISRT(local radiation) or rituximab if ISRT is contraindicated,   3. I discussed with the patient that given his age and comorbidity surveillance as long as there is no symptoms could be alternative option, patient expressed understanding and he would like to  do everything possible to be cancer free as it is early stage with possibility of cure  4. PET/CT and if it is confirmed clinical stage I >I will discuss the case with radiation oncology otherwise we will offer patient Rituxan single agent versus no treatment       I spent a total of 60 minutes on the date of the service which included preparing to see the patient, face-to-face patient care, completing clinical documentation, obtaining and/or reviewing separately obtained history, performing a medically appropriate examination, counseling and educating the patient/family/caregiver and ordering medications, tests, or procedures. Patient's conditions were taken into consideration when deciding risk-benefit for therapy. Patient is at high risk for drug interaction risk of complications. Given multiple comorbid conditions patient is at high risk for complication from intervention, risk of hospitalization, medical interaction overall life expectancy. NCCN guidelines were reviewed and discussed with the patient. The diagnosis and care plan were discussed with the patient in detail.  I discussed the natural history of the disease, prognosis, risks and goals of therapy and answered all the patients questions to the best of my ability. Patient expressed understanding and was in agreement. Thank you for the consult we will follow the patient with you                                                [facility]                            This note is created with the assistance of a speech recognition program.  While intending to generate a document that actually reflects the content of the visit, the document can still have some errors including those of syntax and sound a like substitutions which may escape proof reading. It such instances, actual meaning can be extrapolated by contextual diversion.

## 2022-04-25 ENCOUNTER — HOSPITAL ENCOUNTER (OUTPATIENT)
Dept: NUCLEAR MEDICINE | Age: 87
Discharge: HOME | End: 2022-04-27
Payer: COMMERCIAL

## 2022-04-25 DIAGNOSIS — C88.4 EXTRANODAL MARGINAL ZONE LYMPHOMA OF MUCOSA-ASSOCIATED LYMPHOID TISSUE (MALT) OF STOMACH (HCC): ICD-10-CM

## 2022-04-25 LAB — GLUCOSE BLD-MCNC: 96 MG/DL (ref 75–110)

## 2022-04-25 PROCEDURE — 82947 ASSAY GLUCOSE BLOOD QUANT: CPT

## 2022-04-25 PROCEDURE — A9552 F18 FDG: HCPCS | Performed by: INTERNAL MEDICINE

## 2022-04-25 PROCEDURE — 78813 PET IMAGE FULL BODY: CPT

## 2022-04-25 PROCEDURE — 2580000003 HC RX 258: Performed by: INTERNAL MEDICINE

## 2022-04-25 PROCEDURE — 3430000000 HC RX DIAGNOSTIC RADIOPHARMACEUTICAL: Performed by: INTERNAL MEDICINE

## 2022-04-25 RX ORDER — FLUDEOXYGLUCOSE F 18 200 MCI/ML
10 INJECTION, SOLUTION INTRAVENOUS
Status: COMPLETED | OUTPATIENT
Start: 2022-04-25 | End: 2022-04-25

## 2022-04-25 RX ORDER — SODIUM CHLORIDE 0.9 % (FLUSH) 0.9 %
10 SYRINGE (ML) INJECTION ONCE
Status: COMPLETED | OUTPATIENT
Start: 2022-04-25 | End: 2022-04-25

## 2022-04-25 RX ADMIN — SODIUM CHLORIDE, PRESERVATIVE FREE 10 ML: 5 INJECTION INTRAVENOUS at 07:56

## 2022-04-25 RX ADMIN — FLUDEOXYGLUCOSE F 18 11.3 MILLICURIE: 200 INJECTION, SOLUTION INTRAVENOUS at 07:56

## 2022-04-26 ENCOUNTER — TELEPHONE (OUTPATIENT)
Dept: ONCOLOGY | Age: 87
End: 2022-04-26

## 2022-04-26 ENCOUNTER — OFFICE VISIT (OUTPATIENT)
Dept: ONCOLOGY | Age: 87
End: 2022-04-26
Payer: COMMERCIAL

## 2022-04-26 VITALS
TEMPERATURE: 96.9 F | DIASTOLIC BLOOD PRESSURE: 72 MMHG | SYSTOLIC BLOOD PRESSURE: 121 MMHG | HEART RATE: 69 BPM | BODY MASS INDEX: 28.66 KG/M2 | WEIGHT: 183 LBS

## 2022-04-26 DIAGNOSIS — Z85.51 HISTORY OF BLADDER CANCER: ICD-10-CM

## 2022-04-26 DIAGNOSIS — C88.4 EXTRANODAL MARGINAL ZONE LYMPHOMA OF MUCOSA-ASSOCIATED LYMPHOID TISSUE (MALT) OF STOMACH (HCC): Primary | ICD-10-CM

## 2022-04-26 DIAGNOSIS — H91.93 BILATERAL HEARING LOSS, UNSPECIFIED HEARING LOSS TYPE: ICD-10-CM

## 2022-04-26 DIAGNOSIS — Z85.820 HISTORY OF MELANOMA: ICD-10-CM

## 2022-04-26 DIAGNOSIS — Z85.46 HISTORY OF PROSTATE CANCER: ICD-10-CM

## 2022-04-26 PROCEDURE — 99215 OFFICE O/P EST HI 40 MIN: CPT | Performed by: INTERNAL MEDICINE

## 2022-04-26 RX ORDER — FUROSEMIDE 20 MG/1
20 TABLET ORAL DAILY PRN
COMMUNITY

## 2022-04-26 NOTE — PROGRESS NOTES
Patient ID: Padma Brock, 9/3/1933, [id number], 80 y.o.   Referred by :  Tia Bryan MD    Reason for consultation: Gastric marginal zone lymphoma      HISTORY OF PRESENT ILLNESS:    Oncologic History:    Padma Brock is a very pleasant 80 y.o. 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 [facility] with no radiation  Imaging was done including CT abdomen pelvis and MRI and no evidence of metastasis/cancer/lymphadenopathy    Interim history  Patient had no new symptoms  Underwent a PET/CT and no evidence of lymphoma activity elsewhere other than stomach  There was diffuse low-level activity throughout the stomach and the small bowel without CT abnormality    Past Medical History:   Diagnosis Date    Bilateral edema of lower extremity 1995    Bilateral hearing loss     wears hearing aids    Diverticulosis of sigmoid colon     Gout     History of bladder cancer 1980    Dr. Thor Cain yearly cysto    History of colon polyps 09/10/2012    History of hypokalemia     History of melanoma 2008    left cheek    History of prostate cancer 1992    S/P Prostatectomy    Hyperlipidemia     Hypertension     Tubular adenoma of colon 09/10/2012       Past Surgical History:   Procedure Laterality Date    COLONOSCOPY  2005    dr Linton Presume  09/10/2012    significant diverticulosis, tubular adenoma-sigmoid colon, small hemorrhoids    COLONOSCOPY  10/03/2016    significant diverticulosis sigmoid colon, small polyp rectum-hyperplastic    EYE SURGERY Bilateral 1994    cataracts removed    HERNIA REPAIR Right 1956    inguinal    HERNIA REPAIR Left 1995    inguinal    JOINT REPLACEMENT Right 1984    great toe joint D/T gout    KNEE ARTHROPLASTY Left 2000    meniscal repair    PROSTATE SURGERY  2002       Allergies   Allergen Reactions    Mirabegron      Elevated blood pressure       Current Outpatient Medications   Medication Sig Dispense Refill    apixaban (ELIQUIS) 5 MG TABS tablet Take 5 mg by mouth 2 times daily      furosemide (LASIX) 20 MG tablet Take 20 mg by mouth daily as needed for Other      pantoprazole (PROTONIX) 40 MG tablet Take 1 tablet by mouth daily 90 tablet 1    magnesium oxide (MAG-OX) 400 MG tablet Take 1 tablet by mouth daily 90 tablet 1    amLODIPine (NORVASC) 5 MG tablet TAKE 1 TABLET BY MOUTH ONCE DAILY 90 tablet 1    latanoprost (XALATAN) 0.005 % ophthalmic solution INSTILL 1 DROP INTO EACH EYE NIGHTLY 3 each 1    atorvastatin (LIPITOR) 20 MG tablet Take 1 tablet by mouth daily 90 tablet 1    gabapentin (NEURONTIN) 100 MG capsule Take 1 capsule at night 90 capsule 1    potassium chloride (KLOR-CON M) 20 MEQ extended release tablet Take 1 tablet by mouth daily 90 tablet 1    lisinopril (PRINIVIL;ZESTRIL) 40 MG tablet Take 1 tablet by mouth daily 90 tablet 1    allopurinol (ZYLOPRIM) 300 MG tablet Take 1 tablet by mouth daily 90 tablet 1    timolol (TIMOPTIC) 0.5 % ophthalmic solution Place 1 drop into the left eye daily 3 each 1    olopatadine (PATANASE) 0.6 % SOLN nassl soln SPRAY 2 SPRAY(S) IN EACH NOSTRIL NIGHTLY 3 each 1    hydroCHLOROthiazide (HYDRODIURIL) 25 MG tablet Take 1 tablet by mouth daily 90 tablet 1    ipratropium (ATROVENT) 0.03 % nasal spray 2 sprays by Nasal route 3 times daily 3 each 1    lidocaine 4 % external patch Place 1 patch onto the skin daily 30 patch 2    potassium chloride (KLOR-CON M) 10 MEQ extended release tablet Take 1 tablet by mouth 2 times daily 30 tablet 0    meloxicam (MOBIC) 15 MG tablet Take 1 tablet by mouth daily 90 tablet 1    tamsulosin (FLOMAX) 0.4 MG capsule Take 1 capsule by mouth daily (Patient not taking: Reported on 7/7/2021) 90 capsule 3     No current facility-administered medications for this visit. Social History     Socioeconomic History    Marital status:      Spouse name: Not on file    Number of children: Not on file    Years of education: Not on file    Highest education level: Not on file   Occupational History    Not on file   Tobacco Use    Smoking status: Never Smoker    Smokeless tobacco: Never Used   Vaping Use    Vaping Use: Never used   Substance and Sexual Activity    Alcohol use: Yes     Alcohol/week: 10.0 standard drinks     Types: 10 Shots of liquor per week    Drug use: No    Sexual activity: Not on file   Other Topics Concern    Not on file   Social History Narrative    Not on file     Social Determinants of Health     Financial Resource Strain: Low Risk     Difficulty of Paying Living Expenses: Not hard at all   Food Insecurity: No Food Insecurity    Worried About [facility] in the Last Year: Never true    [facility] in the Last Year: Never true   Transportation Needs:     Lack of Transportation (Medical): Not on file    Lack of Transportation (Non-Medical):  Not on file   Physical Activity:     Days of Exercise per Week: Not on file    Minutes of Exercise per Session: Not on file   Stress:     Feeling of Stress : Not on file   Social Connections:     Frequency of Communication with Friends and Family: Not on file    Frequency of Social Gatherings with Friends and Family: Not on file    Attends Anabaptism Services: Not on file   [employer]s or Organizations: Not on file    Attends Club or Organization Meetings: Not on file    Marital Status: Not on file   Intimate Partner Violence:     Fear of Current or Ex-Partner: Not on file    Emotionally Abused: Not on file    Physically Abused: Not on file    Sexually Abused: Not on file   Housing Stability:     Unable to Pay for Housing in the Last Year: Not on file    Number of [place] in the Last Year: Not on file    Unstable Housing in the Last Year: Not on file       Family History   Problem Relation Age of Onset    Heart Attack Mother     Heart Attack Father         REVIEW OF SYSTEM:     Constitutional: No fever or chills. No night sweats, no weight loss   Eyes: No eye discharge, double vision, or eye pain   HEENT: negative for sore mouth, sore throat, hoarseness and voice change   Respiratory: negative for cough , sputum, dyspnea, wheezing, hemoptysis, chest pain   Cardiovascular: negative for chest pain, dyspnea, palpitations, orthopnea, PND   Gastrointestinal: negative for nausea, vomiting, diarrhea, constipation, abdominal pain, Dysphagia, hematemesis and hematochezia   Genitourinary: negative for frequency, dysuria, nocturia, urinary incontinence, and hematuria   Integument: negative for rash, skin lesions, bruises.    Hematologic/Lymphatic: negative for easy bruising, bleeding, lymphadenopathy, petechiae and swelling/edema   Endocrine: negative for heat or cold intolerance, tremor, weight changes, change in bowel habits and hair loss   Musculoskeletal: negative for myalgias, arthralgias, pain, joint swelling,and bone pain   Neurological: negative for headaches, dizziness, seizures, weakness, numbness       OBJECTIVE:         Vitals:    04/26/22 0808   BP: 121/72   Pulse: 69   Temp: 96.9 °F (36.1 °C)       PHYSICAL EXAM:   General appearance - well appearing, no in pain or distress   Mental status - alert and cooperative   Eyes - pupils equal and reactive, extraocular eye movements intact   Ears - bilateral TM's and external ear canals normal   Mouth - mucous membranes moist, pharynx normal without lesions   Neck - supple, no significant adenopathy   Lymphatics - no palpable lymphadenopathy, no hepatosplenomegaly   Chest - clear to auscultation, no wheezes, rales or rhonchi, symmetric air entry   Heart - normal rate, regular rhythm, normal S1, S2, no murmurs, rubs, clicks or gallops   Abdomen - soft, nontender, nondistended, no masses or organomegaly   Neurological - alert, oriented, normal speech, no focal findings or movement disorder noted   Musculoskeletal - no joint tenderness, deformity or swelling   Extremities - peripheral pulses normal, no pedal edema, no clubbing or cyanosis   Skin - normal coloration and turgor, no rashes, no suspicious skin lesions noted ,      LABORATORY DATA:     Lab Results   Component Value Date    WBC 8.9 04/19/2022    HGB 13.8 04/19/2022    HCT 41.2 04/19/2022    MCV 93.5 04/19/2022     04/19/2022    LYMPHOPCT 11 (L) 04/19/2022    RBC 4.40 (L) 04/19/2022    MCH 31.4 04/19/2022    MCHC 33.6 04/19/2022    RDW 14.4 04/19/2022    NEUTOPHILPCT 72.9 07/23/2020    MONOPCT 12 (H) 04/19/2022    EOSPCT 0.2 07/23/2020    BASOPCT 1 04/19/2022    NEUTROABS 6.50 04/19/2022    LYMPHSABS 1.00 04/19/2022    MONOSABS 1.10 04/19/2022    EOSABS 0.30 04/19/2022    BASOSABS 0.10 04/19/2022         Chemistry        Component Value Date/Time     04/19/2022 1422    K 4.4 04/19/2022 1422     04/19/2022 1422    CO2 26 04/19/2022 1422    BUN 28 (H) 04/19/2022 1422    CREATININE 1.31 (H) 04/19/2022 1422        Component Value Date/Time    CALCIUM 9.3 04/19/2022 1422    ALKPHOS 182 (H) 04/19/2022 1422    AST 20 04/19/2022 1422    ALT 12 04/19/2022 1422    BILITOT 1.12 04/19/2022 1422            PATHOLOGY DATA:         IMAGING DATA:      PET  FULL BODY  Narrative: EXAMINATION:  WHOLE BODY PET/CT WITH LOWER EXTREMITIES.     4/25/2022    TECHNIQUE:  Following IV injection of 11.3 mCi of F 18 -FDG, PET  tumor imaging was  acquired from the top of the skull to the mid thighs. Then, a separate  acquisition of the lower extremities from mid thigh to the tip of the toes  was acquired. Computed tomography was used for purposes of attenuation  correction and anatomic localization. Fusion imaging was utilized for  interpretation. Body uptake time 56 mins. Glucose level 96 mg/dl. COMPARISON:  None    HISTORY:  ORDERING SYSTEM PROVIDED HISTORY: Extranodal marginal zone lymphoma of  mucosa-associated lymphoid tissue (MALT) of stomach (HCC)  TECHNOLOGIST PROVIDED HISTORY:  Reason for Exam: Extranodal marginal zone lymphoma of mucosa-associated  lymphoid tissue (MALT) of stomach (HCC) C88.4 (ICD-10-CM)    FINDINGS:  HEAD/NECK: No abnormal metabolic activity. No identifiable lymph nodes. CHEST: No abnormal metabolic activity. ABDOMEN/PELVIS: No abnormal metabolic activity identified. Diffuse low level  activity is present throughout the stomach and small bowel without CT  abnormality. No lymphadenopathy. Normal size spleen without abnormal  metabolic activity. BONES/SOFT TISSUE: No suspicious metabolic activity identified. Scattered  areas of nonspecific low level activity in the paraspinal soft tissues  shoulders and about the left knee arthroplasty without CT abnormality  appreciated. INCIDENTAL CT FINDINGS: Calcified atheromatous plaque and coronary  calcification. Sequela of old granulomatous disease. Anterolateral left  pleural lipoma. Diverticulosis. Status post prostatectomy. Impression: No suspicious metabolic activity or lymphadenopathy identified. ASSESSMENT:       Diagnosis Orders   1. Extranodal marginal zone lymphoma of mucosa-associated lymphoid tissue (MALT) of stomach ([facility] [address]  Ambulatory referral to Radiation Oncology   2. History of prostate cancer     3. History of bladder cancer     4. History of melanoma     5.  Bilateral hearing loss, unspecified hearing loss type          1. Gastric MALT lymphoma clinical stage I  2. Multiple comorbidity  3. Remote history of bladder cancer prostate cancer and recently angiosarcoma of the right cheek surgery done at [address] [place] [address],[address] with no evidence of recurrence    PLAN:     1. Reviewed lab work, imaging studies, outside records and discuss possible diagnosis and treatment recommendations  2. Patient had stage I gastric MALT lymphoma based on the imaging with no lymphadenopathy, explained to the patient that this is indolent lymphoma and the standard of care would be observation/surveillance(for stage II or above) unless had symptoms or indication for treatment but with stage I there is a option of cure with ISRT(local radiation) or rituximab if ISRT is contraindicated,   3. I discussed with the patient that given his age and comorbidity surveillance as long as there is no symptoms could be alternative option, patient expressed understanding and he would like to  do everything possible to be cancer free as it is early stage with possibility of cure  4. We will make referral to radiation oncology and come back after seen by radiation oncology if patient not interested in radiation we will offer him rituximab single agent         I spent a total of 40 minutes on the date of the service which included preparing to see the patient, face-to-face patient care, completing clinical documentation, obtaining and/or reviewing separately obtained history, performing a medically appropriate examination, counseling and educating the patient/family/caregiver and ordering medications, tests, or procedures. Patient's conditions were taken into consideration when deciding risk-benefit for therapy. Patient is at high risk for drug interaction risk of complications.   Given multiple comorbid conditions patient is at high risk for complication from intervention, risk of hospitalization, medical interaction overall life expectancy. NCCN guidelines were reviewed and discussed with the patient. The diagnosis and care plan were discussed with the patient in detail. I discussed the natural history of the disease, prognosis, risks and goals of therapy and answered all the patients questions to the best of my ability. Patient expressed understanding and was in agreement. Thank you for the consult we will follow the patient with you                                                [facility]                            This note is created with the assistance of a speech recognition program.  While intending to generate a document that actually reflects the content of the visit, the document can still have some errors including those of syntax and sound a like substitutions which may escape proof reading. It such instances, actual meaning can be extrapolated by contextual diversion.

## 2022-04-26 NOTE — TELEPHONE ENCOUNTER
AVS from 4/26/22     Radiation oncology consult    Ambulatory referral to Radiation Oncology (Dr. Cameron Palomares MD)                   Return in about 4 weeks      RO appt 5/2/22 @ 11am at [facility]  rv scheduled for 5/24/22 @ 9am    Pt was given AVS and appt schedule

## 2022-05-02 ENCOUNTER — HOSPITAL ENCOUNTER (OUTPATIENT)
Dept: RADIATION ONCOLOGY | Age: 87
Discharge: HOME | End: 2022-05-02
Payer: COMMERCIAL

## 2022-05-02 VITALS
DIASTOLIC BLOOD PRESSURE: 73 MMHG | SYSTOLIC BLOOD PRESSURE: 122 MMHG | TEMPERATURE: 97.6 F | WEIGHT: 183 LBS | HEART RATE: 63 BPM | RESPIRATION RATE: 16 BRPM | BODY MASS INDEX: 28.66 KG/M2 | OXYGEN SATURATION: 97 %

## 2022-05-02 PROCEDURE — 99205 OFFICE O/P NEW HI 60 MIN: CPT | Performed by: RADIOLOGY

## 2022-05-02 PROCEDURE — 99213 OFFICE O/P EST LOW 20 MIN: CPT | Performed by: RADIOLOGY

## 2022-05-02 NOTE — PROGRESS NOTES
Referring Physician:  Dr. Elmo Muñiz:    22 1105   BP: 122/73   Pulse: 63   Resp: 16   Temp: 97.6 °F (36.4 °C)   SpO2: 97%    : Wt Readings from Last 1 Encounters:   22 183 lb (83 kg)        Body mass index is 28.66 kg/m². Immunizations:    Influenza status:    [x]   Current   []   Patient declined    Pneumococcal status:  [x]   Current  []   Patient declined  Covid status:   [x]  Dose #1:                     [x]  Dose #2:     [x]  Booster:               []  Patient declined    Smoking Status:    [] Smoker - PPD:   [] Nonsmoker - Quit Date:               [x] Never a smoker      No chief complaint on file. Cancer Staging  No matching staging information was found for the patient. Prior Radiation Therapy? No   If yes, site treated:   Facility:                             Date:    Concurrent Chemo/radiation? No   If yes, start date:    Prior Chemotherapy? No   If yes    Facility:                             Date:    Prior Hormonal Therapy or Contraceptive Medications? No   If yes,  Medication:                                Duration:    Head and Neck Cancer? No   If yes, please remind physician to place swallow study order and speech therapy order. Pacemaker/Defibulator/ICD:  No    Do you have any musculoskeletal diseases, Lupus or arthritic conditions? No   If yes, are you currently taking Methotrexate?   []  Yes  [x]  No           BREAST/GYN  Patient only:     LMP:    Age at first Menses:    Para:    :    Cup size:        PROSTATE patient only :    Oral hormone replacement therapy []  Yes  []  No            Current Outpatient Medications   Medication Sig Dispense Refill    apixaban (ELIQUIS) 5 MG TABS tablet Take 5 mg by mouth 2 times daily      furosemide (LASIX) 20 MG tablet Take 20 mg by mouth daily as needed for Other      pantoprazole (PROTONIX) 40 MG tablet Take 1 tablet by mouth daily 90 tablet 1    magnesium oxide (MAG-OX) 400 MG tablet Take 1 tablet by mouth daily 90 tablet 1    amLODIPine (NORVASC) 5 MG tablet TAKE 1 TABLET BY MOUTH ONCE DAILY 90 tablet 1    latanoprost (XALATAN) 0.005 % ophthalmic solution INSTILL 1 DROP INTO EACH EYE NIGHTLY 3 each 1    atorvastatin (LIPITOR) 20 MG tablet Take 1 tablet by mouth daily 90 tablet 1    gabapentin (NEURONTIN) 100 MG capsule Take 1 capsule at night 90 capsule 1    potassium chloride (KLOR-CON M) 20 MEQ extended release tablet Take 1 tablet by mouth daily 90 tablet 1    lisinopril (PRINIVIL;ZESTRIL) 40 MG tablet Take 1 tablet by mouth daily 90 tablet 1    allopurinol (ZYLOPRIM) 300 MG tablet Take 1 tablet by mouth daily 90 tablet 1    timolol (TIMOPTIC) 0.5 % ophthalmic solution Place 1 drop into the left eye daily 3 each 1    olopatadine (PATANASE) 0.6 % SOLN nassl soln SPRAY 2 SPRAY(S) IN EACH NOSTRIL NIGHTLY 3 each 1    hydroCHLOROthiazide (HYDRODIURIL) 25 MG tablet Take 1 tablet by mouth daily 90 tablet 1    ipratropium (ATROVENT) 0.03 % nasal spray 2 sprays by Nasal route 3 times daily 3 each 1    lidocaine 4 % external patch Place 1 patch onto the skin daily 30 patch 2    potassium chloride (KLOR-CON M) 10 MEQ extended release tablet Take 1 tablet by mouth 2 times daily 30 tablet 0    meloxicam (MOBIC) 15 MG tablet Take 1 tablet by mouth daily 90 tablet 1    tamsulosin (FLOMAX) 0.4 MG capsule Take 1 capsule by mouth daily (Patient not taking: Reported on 7/7/2021) 90 capsule 3     No current facility-administered medications for this encounter.        Past Medical History:   Diagnosis Date    Bilateral edema of lower extremity 1995    Bilateral hearing loss     wears hearing aids    Diverticulosis of sigmoid colon     Gout     History of bladder cancer 1980    Dr. Fabiola Rubalcava yearly cysto    History of colon polyps 09/10/2012    History of hypokalemia     History of melanoma 2008    left cheek    History of prostate cancer 1992    S/P Prostatectomy    Hyperlipidemia     Hypertension     Tubular adenoma of colon 09/10/2012       Past Surgical History:   Procedure Laterality Date    COLONOSCOPY  2005    dr Nallely Calalhan  09/10/2012    significant diverticulosis, tubular adenoma-sigmoid colon, small hemorrhoids    COLONOSCOPY  10/03/2016    significant diverticulosis sigmoid colon, small polyp rectum-hyperplastic    EYE SURGERY Bilateral 1994    cataracts removed    HERNIA REPAIR Right 1956    inguinal    HERNIA REPAIR Left 1995    inguinal    JOINT REPLACEMENT Right 1984    great toe joint D/T gout    KNEE ARTHROPLASTY Left 2000    meniscal repair    PROSTATE SURGERY  2002       Family History   Problem Relation Age of Onset    Heart Attack Mother     Heart Attack Father        Social History     Socioeconomic History    Marital status:      Spouse name: Not on file    Number of children: Not on file    Years of education: Not on file    Highest education level: Not on file   Occupational History    Not on file   Tobacco Use    Smoking status: Never Smoker    Smokeless tobacco: Never Used   Vaping Use    Vaping Use: Never used   Substance and Sexual Activity    Alcohol use: Yes     Alcohol/week: 10.0 standard drinks     Types: 10 Shots of liquor per week    Drug use: No    Sexual activity: Not on file   Other Topics Concern    Not on file   Social History Narrative    Not on file     Social Determinants of Health     Financial Resource Strain: Low Risk     Difficulty of Paying Living Expenses: Not hard at all   Food Insecurity: No Food Insecurity    Worried About [address] [employer] in the Last Year: Never true    [facility] in the Last Year: Never true   Transportation Needs:     Lack of Transportation (Medical): Not on file    Lack of Transportation (Non-Medical):  Not on file   Physical Activity:     Days of Exercise per Week: Not on file    Minutes of Exercise per Session: Not on file   Stress:     Feeling of Stress : Not on file   Social Connections:     Frequency of Communication with Friends and Family: Not on file    Frequency of Social Gatherings with Friends and Family: Not on file    Attends Evangelical Services: Not on file    Active Member of Clubs or Organizations: Not on file    Attends Club or Organization Meetings: Not on file    Marital Status: Not on file   Intimate Partner Violence:     Fear of Current or Ex-Partner: Not on file    Emotionally Abused: Not on file    Physically Abused: Not on file    Sexually Abused: Not on file   Housing Stability:     Unable to Pay for Housing in the Last Year: Not on file    Number of [place] in the Last Year: Not on file    Unstable Housing in the Last Year: Not on file             FALLS RISK SCREEN  Instructions:  Assess the patient and enter the appropriate indicators that are present for fall risk identification. Total the numbers entered and assign a fall risk score from Table 2.  Reassess patient at a minimum every 12 weeks or with status change. Assessment   Date  5/2/2022     1. Mental Ability: confusion/cognitively impaired 0     2. Elimination Issues: incontinence, frequency 0       3. Ambulatory: use of assistive devices (walker, cane, off-loading devices),        attached to equipment (IV pole, oxygen) 2     4. Sensory Limitations: dizziness, vertigo, impaired vision 0     5. Age less than 65        0     6. Age 72 or greater 1     7. Medication: diuretics, strong analgesics, hypnotics, sedatives,        antihypertensive agents 3   8. Falls:  recent history of falls within the last 3 months (not to include slipping or        tripping) 0   TOTAL 6    If score of 4 or greater was education given? Yes       TABLE 2   Risk Score Risk Level Plan of Care   0-3 Little or  No Risk 1. Provide assistance as indicated for ambulation activities  2. Reorient confused/cognitively impaired patient  3.   Call-light/bell within patient's reach  4. Chair/bed in low position, stretcher/bed with siderails up except when performing patient care activities  5. Educate patient/family/caregiver on falls prevention  6.  Reassess in 12 weeks or with any noted change in patient condition which places them at a risk for a fall   4-6 Moderate Risk 1. Provide assistance as indicated for ambulation activities  2. Reorient confused/cognitively impaired patient  3. Call-light/bell within patient's reach  4. Chair/bed in low position, stretcher/bed with siderails up except when performing patient care activities  5. Educate patient/family/caregiver on falls prevention     7 or   Higher High Risk 1. Place patient in easily observable treatment room  2. Patient attended at all times by family member or staff  3. Provide assistance as indicated for ambulation activities  4. Reorient confused/cognitively impaired patient  5. Call-light/bell within patient's reach  6. Chair/bed in low position, stretcher/bed with siderails up except when performing patient care activities  7. Educate patient/family/caregiver on falls prevention             Assessment/Plan: Patient was seen today for consultation. Pt with wife today to discuss possible treatment. Dr. Rachael Grover updated and examined pt. Per MD pt would like to speak more with Dr. Kristi Bhardwaj about possible systemic treatment before making a decision about Radiation treatment. Pt will be put on pending and will need a follow up if he decides to proceed with RT. Pt will see MO on 5/24 and will touch base with him and wife after appt.          Simone Macias RN 5/2/2022 1:08 PM

## 2022-05-02 NOTE — CONSULTS
[facility]            Radiation Oncology          [address] [facility]          [place] [address] [place], [address]        [place]: [phone]        F: [phone]       [place]           2022    Patient: Philip Packer   YOB: 1933       Dear Dr Krista Jones: Thank you for referring Philip Packer to me for evaluation. Below are the relevant portions of my assessment and plan of care. If you have questions, please do not hesitate to call me. I look forward to following this patient along with you. Sincerely,    Electronically signed by Leia Pruitt MD on 22 at 12:40 PM EDT    CC: Patient Care Team:  Roxanne Reid MD as PCP - General (Family Medicine)  Roxanne Reid MD as PCP - [facility]  Adan Smith MD as Consulting Physician (Gastroenterology)  Artemio Meyer MD as Consulting Physician (Otolaryngology)  Anca Garrett MD as Surgeon (Ophthalmology)  Beth King MD as Consulting Physician (Urology)  Kaylen York MD as Consulting Physician (Internal Medicine Cardiovascular Disease)  Rigoberto Hermosillo MD (Dermatology)  ------------------------------------------------------------------------------------------------------------------------------------------------------------------------------------------      RADIATION ONCOLOGY NEW PATIENT VISIT:    Date of Service: 2022    Location:  [address] [place] [address],   [address] [place] [address]., [place]   [phone]     Patient ID:   Philip Packer  : 9/3/1933   MRN: [id number]    CHIEF COMPLAINT: \"lymphoma \"    DIAGNOSIS: Gastric MALT lymphoma stage IAE     HISTORY OF PRESENT ILLNESS:   Philip Packer is a 80 y.o. male who presents for consultation of radiotherapy treatment options the above diagnosis. He has a remote history of bladder cancer and prostate cancer. He has not had radiation therapy before.   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 He not receive radiation after that. He had some abdominal pain and underwent endoscopy. There was a prominent fold of the upper body of the stomach 2 cm below the GE junction with no other abnormalities noted in the lateral more esophagus. 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 Lymphocytes were positive for CD20 and negative for CD3 CD43 CD5 and CD10. H. pylori was negative. He never has any abdominal pain. He also has no nausea vomiting or symptoms of GERD. He has not had problems with diarrhea or constipation. He denies any blood in urine or bowel movements. He does not have a cardiac implanted device. PET/CT done 4/25/2022 showed no evidence of metastatic disease. There was low activity seen in the stomach otherwise negative. He saw medical oncology on 4/26/2020 to discuss treatment options. He was referred to radiation oncology to discuss definitive nonsurgical options.     PRIOR RADIATION HISTORY:  No prior history of radiation therapy    PACEMAKER: None    PAST MEDICAL HISTORY:  Past Medical History:   Diagnosis Date    Bilateral edema of lower extremity 1995    Bilateral hearing loss     wears hearing aids    Diverticulosis of sigmoid colon     Gout     History of bladder cancer 1980    Dr. Marquez Level yearly cysto    History of colon polyps 09/10/2012    History of hypokalemia     History of melanoma 2008    left cheek    History of prostate cancer 1992    S/P Prostatectomy    Hyperlipidemia     Hypertension     Tubular adenoma of colon 09/10/2012       PAST SURGICAL HISTORY:  Past Surgical History:   Procedure Laterality Date    COLONOSCOPY  2005    dr Kvng Mo[place]uma  09/10/2012    significant diverticulosis, tubular adenoma-sigmoid colon, small hemorrhoids    COLONOSCOPY  10/03/2016    significant diverticulosis sigmoid colon, small polyp rectum-hyperplastic    EYE SURGERY Bilateral 1994    cataracts removed    HERNIA REPAIR Right 1956    inguinal    HERNIA REPAIR Left 1995    inguinal    JOINT REPLACEMENT Right 1984    great toe joint D/T gout    KNEE ARTHROPLASTY Left 2000    meniscal repair    PROSTATE SURGERY  2002       MEDICATIONS:    Current Outpatient Medications:     apixaban (ELIQUIS) 5 MG TABS tablet, Take 5 mg by mouth 2 times daily, Disp: , Rfl:     furosemide (LASIX) 20 MG tablet, Take 20 mg by mouth daily as needed for Other, Disp: , Rfl:     pantoprazole (PROTONIX) 40 MG tablet, Take 1 tablet by mouth daily, Disp: 90 tablet, Rfl: 1    magnesium oxide (MAG-OX) 400 MG tablet, Take 1 tablet by mouth daily, Disp: 90 tablet, Rfl: 1    amLODIPine (NORVASC) 5 MG tablet, TAKE 1 TABLET BY MOUTH ONCE DAILY, Disp: 90 tablet, Rfl: 1    latanoprost (XALATAN) 0.005 % ophthalmic solution, INSTILL 1 DROP INTO EACH EYE NIGHTLY, Disp: 3 each, Rfl: 1    atorvastatin (LIPITOR) 20 MG tablet, Take 1 tablet by mouth daily, Disp: 90 tablet, Rfl: 1    gabapentin (NEURONTIN) 100 MG capsule, Take 1 capsule at night, Disp: 90 capsule, Rfl: 1    potassium chloride (KLOR-CON M) 20 MEQ extended release tablet, Take 1 tablet by mouth daily, Disp: 90 tablet, Rfl: 1    lisinopril (PRINIVIL;ZESTRIL) 40 MG tablet, Take 1 tablet by mouth daily, Disp: 90 tablet, Rfl: 1    allopurinol (ZYLOPRIM) 300 MG tablet, Take 1 tablet by mouth daily, Disp: 90 tablet, Rfl: 1    timolol (TIMOPTIC) 0.5 % ophthalmic solution, Place 1 drop into the left eye daily, Disp: 3 each, Rfl: 1    olopatadine (PATANASE) 0.6 % SOLN nassl soln, SPRAY 2 SPRAY(S) IN EACH NOSTRIL NIGHTLY, Disp: 3 each, Rfl: 1    hydroCHLOROthiazide (HYDRODIURIL) 25 MG tablet, Take 1 tablet by mouth daily, Disp: 90 tablet, Rfl: 1    ipratropium (ATROVENT) 0.03 % nasal spray, 2 sprays by Nasal route 3 times daily, Disp: 3 each, Rfl: 1    lidocaine 4 % external patch, Place 1 patch onto the skin daily, Disp: 30 patch, Rfl: 2    potassium chloride (KLOR-CON M) 10 MEQ extended release tablet, Take 1 tablet by mouth 2 times daily, Disp: 30 tablet, Rfl: 0    meloxicam (MOBIC) 15 MG tablet, Take 1 tablet by mouth daily, Disp: 90 tablet, Rfl: 1    tamsulosin (FLOMAX) 0.4 MG capsule, Take 1 capsule by mouth daily (Patient not taking: Reported on 7/7/2021), Disp: 90 capsule, Rfl: 3    ALLERGIES:   Allergies   Allergen Reactions    Mirabegron      Elevated blood pressure       SOCIAL HISTORY:  Social History     Socioeconomic History    Marital status:      Spouse name: Not on file    Number of children: Not on file    Years of education: Not on file    Highest education level: Not on file   Occupational History    Not on file   Tobacco Use    Smoking status: Never Smoker    Smokeless tobacco: Never Used   Vaping Use    Vaping Use: Never used   Substance and Sexual Activity    Alcohol use: Yes     Alcohol/week: 10.0 standard drinks     Types: 10 Shots of liquor per week    Drug use: No    Sexual activity: Not on file   Other Topics Concern    Not on file   Social History Narrative    Not on file     Social Determinants of Health     Financial Resource Strain: Low Risk     Difficulty of Paying Living Expenses: Not hard at all   Food Insecurity: No Food Insecurity    Worried About [address] [facility] in the Last Year: Never true    [address] [facility] [address] in the Last Year: Never true   Transportation Needs:     Lack of Transportation (Medical): Not on file    Lack of Transportation (Non-Medical):  Not on file   Physical Activity:     Days of Exercise per Week: Not on file    Minutes of Exercise per Session: Not on file   Stress:     Feeling of Stress : Not on file   Social Connections:     Frequency of Communication with Friends and Family: Not on file    Frequency of Social Gatherings with Friends and Family: Not on file    Attends Voodoo Services: Not on file   [employer] of Clubs or Organizations: Not on file    Attends Club or Organization Meetings: Not on file    Marital Status: Not on file   Intimate Partner Violence:     Fear of Current or Ex-Partner: Not on file    Emotionally Abused: Not on file    Physically Abused: Not on file    Sexually Abused: Not on file   Housing Stability:     Unable to Pay for Housing in the Last Year: Not on file    Number of [place] in the Last Year: Not on file    Unstable Housing in the Last Year: Not on file       FAMILY HISTORY:  Family History   Problem Relation Age of Onset    Heart Attack Mother     Heart Attack Father        REVIEW OF SYSTEMS:    GENERAL/CONSTITUTIONAL: The patient denies fever, fatigue, weakness, weight gain or weight loss. HEENT: The patient denies pain, redness, loss of vision, double or blurred vision. The patient denies ringing in the ears, loss of hearing, hoarseness, trouble swallowing, or painful swallowing. CARDIOVASCULAR: The patient denies chest pain, irregular heartbeats, sudden changes in heartbeat or palpitation. RESPIRATORY: The patient denies shortness of breath, cough, hemoptysis. GASTROINTESTINAL: The patient denies nausea, vomiting, diarrhea, constipation, blood in the stools. GENITOURINARY: The patient denies difficult urination, pain or burning with urination, blood in the urine. MUSCULOSKELETAL: The patient denies arm, buttock, thigh or calf cramps. No joint or muscle pain. SKIN: The patient denies easy bruising, skin redness, skin rash, hives. NEUROLOGIC: The patient denies headache, dizziness, fainting, muscle spasm, loss of consciousness. ENDOCRINE: The patient denies intolerance to hot or cold temperature, flushing. HEMATOLOGIC/LYMPHATIC: The patient denies anemia, bleeding tendency or clotting tendency. ALLERGIC/IMMUNOLOGIC: The patient denies rhinitis, asthma, skin sensitivity. PYSCHOLOGIC: The patient denies any depression, anxiety, or confusion.     A full 14 point review of systems was performed and assessed and found to be negative except as noted above. PHYSICAL EXAMINATION:    CHAPERONE: Not Required    ECO Symptomatic but completely ambulatory    VITAL SIGNS: /73   Pulse 63   Temp 97.6 °F (36.4 °C) (Temporal)   Resp 16   Wt 183 lb (83 kg)   SpO2 97%   BMI 28.66 kg/m²   GENERAL:  General appearance is that of a well-nourished, well-developed in no apparent distress. HEENT: Normocephalic, atraumatic, EOMI, moist mucosa, no erythema. Indirect exam .  NECK:  No adenopathy or a palpable thyroid mass, trachea is midline. LYMPHATICS: No cervical, supraclavicular, or axillary adenopathy. HEART:  Normal rate and regular rhythm, normal heart sounds. LUNGS:  Pulmonary effort normal. Normal breath sounds. ABDOMEN:  Soft, nontender, non distended, and no hepatosplenomegaly. EXTREMITIES:  No edema. No calf tenderness. MSK:  No spinal tenderness. Normal ROM. NEUROLOGICAL: Alert and oriented. Strength and sensation intact bilaterally. No focal deficits. PSYCH: Mood normal, behavior normal.  SKIN: No erythema, no desquamation. LABS:  WBC   Date Value Ref Range Status   2022 8.9 3.5 - 11.0 k/uL Final     Segs Absolute   Date Value Ref Range Status   2022 6.50 1.3 - 9.1 k/uL Final     Hemoglobin   Date Value Ref Range Status   2022 13.8 13.5 - 17.5 g/dL Final     Platelets   Date Value Ref Range Status   2022 211 150 - 450 k/uL Final     No results found for: , CEA  No results found for:   PSA   Date Value Ref Range Status   2021 0.01 ng/mL Final   2020 0.01 ng/mL Final         IMAGING:   PET/CT 22 reviewed and noted above. PATHOLOGY:  Pathology report reviewed and noted above. ASSESSMENT AND PLAN:   Lawrence Raymond is a 80 y.o. male with gastric MALT lymphoma, h. Pylori negative stage IAE who presents for discussion of radiotherapy treatment options.     I went over NCCN guidelines with the patient and his wife. Typically someone in his position would be offered radiation therapy and if they are not a candidate or do want to have it then they could go with observation or rituximab via medical oncology. He has a radiation candidate my opinion. I discussed the indications, risk, and benefits of radiation therapy in his case. The prescription would be 30Gy/20fx delivered via IMRT/VMAT in order to maximally minimize dose to nearby organs at risk/normal tissues. After all questions were answered he expressed understanding of his diagnosis, prognosis, my recommendations. He is going to see medical oncology in a couple weeks to discuss systemic therapy as treatment. I told him that is certainly fine and reasonable. I told him that if he has any other questions or wants to pursue radiation therapy to give us a call we will get him set up for CT simulation. He knows to call anytime with any questions or concerns may arise in the future. I spent greater than 65 minutes counseling and evaluating the different treatment options available to the patient and formulating a plan of action today. Zane Rhodes MD MD  Electronically signed by Zane Rhodes MD on 5/2/22 at 12:40 PM EDT      Drugs Prescribed:  New Prescriptions    No medications on file       Orders Placed:   No orders of the defined types were placed in this encounter.         CC:  Patient Care Team:  Tony Cast MD as PCP - General (Family Medicine)  Tony Cast MD as PCP - [facility]  Carmen Ireland MD as Consulting Physician (Gastroenterology)  Lexy Villavicencio MD as Consulting Physician (Otolaryngology)  Chetan Ribeiro MD as Surgeon (Ophthalmology)  Terrence Reynoso MD as Consulting Physician (Urology)  Vernel Libman, MD as Consulting Physician (Internal Medicine Cardiovascular Disease)  Jaylene Trujillo MD (Dermatology)

## 2022-05-24 ENCOUNTER — OFFICE VISIT (OUTPATIENT)
Dept: ONCOLOGY | Age: 87
End: 2022-05-24
Payer: COMMERCIAL

## 2022-05-24 ENCOUNTER — TELEPHONE (OUTPATIENT)
Dept: ONCOLOGY | Age: 87
End: 2022-05-24

## 2022-05-24 VITALS
SYSTOLIC BLOOD PRESSURE: 115 MMHG | HEART RATE: 53 BPM | WEIGHT: 182.6 LBS | BODY MASS INDEX: 28.6 KG/M2 | TEMPERATURE: 97.2 F | DIASTOLIC BLOOD PRESSURE: 67 MMHG | RESPIRATION RATE: 16 BRPM

## 2022-05-24 DIAGNOSIS — Z85.46 HISTORY OF PROSTATE CANCER: ICD-10-CM

## 2022-05-24 DIAGNOSIS — Z85.820 HISTORY OF MELANOMA: ICD-10-CM

## 2022-05-24 DIAGNOSIS — Z86.010 HISTORY OF COLON POLYPS: ICD-10-CM

## 2022-05-24 DIAGNOSIS — Z85.51 HISTORY OF BLADDER CANCER: ICD-10-CM

## 2022-05-24 DIAGNOSIS — I42.9 CARDIOMYOPATHY, UNSPECIFIED TYPE (HCC): Primary | ICD-10-CM

## 2022-05-24 DIAGNOSIS — C88.4 EXTRANODAL MARGINAL ZONE LYMPHOMA OF MUCOSA-ASSOCIATED LYMPHOID TISSUE (MALT) OF STOMACH (HCC): ICD-10-CM

## 2022-05-24 PROCEDURE — 99211 OFF/OP EST MAY X REQ PHY/QHP: CPT

## 2022-05-24 PROCEDURE — 99215 OFFICE O/P EST HI 40 MIN: CPT | Performed by: INTERNAL MEDICINE

## 2022-05-24 PROCEDURE — 99211 OFF/OP EST MAY X REQ PHY/QHP: CPT | Performed by: INTERNAL MEDICINE

## 2022-05-24 NOTE — TELEPHONE ENCOUNTER
avs from 5/24/22     2D echo  Literature review about gastric lymphoma/MALT and Rituxan    2D echo sched for 6/1/22 @ 10:30am  Pt was given literature on Lymphoma/malt and rituxan.    Per md to rv on 6/21/22 @ 9:45am    Pt was given AVS and appt schedule    Electronically signed by Adal Lindsey on 5/24/2022 at 10:26 AM

## 2022-06-01 ENCOUNTER — HOSPITAL ENCOUNTER (OUTPATIENT)
Dept: NON INVASIVE DIAGNOSTICS | Age: 87
Discharge: HOME | End: 2022-06-01
Payer: COMMERCIAL

## 2022-06-01 DIAGNOSIS — I42.9 CARDIOMYOPATHY, UNSPECIFIED TYPE (HCC): ICD-10-CM

## 2022-06-01 DIAGNOSIS — C88.4 EXTRANODAL MARGINAL ZONE LYMPHOMA OF MUCOSA-ASSOCIATED LYMPHOID TISSUE (MALT) OF STOMACH (HCC): ICD-10-CM

## 2022-06-01 LAB
LV EF: 68 %
LVEF MODALITY: NORMAL

## 2022-06-01 PROCEDURE — 93306 TTE W/DOPPLER COMPLETE: CPT

## 2022-06-21 ENCOUNTER — OFFICE VISIT (OUTPATIENT)
Dept: ONCOLOGY | Age: 87
End: 2022-06-21
Payer: COMMERCIAL

## 2022-06-21 ENCOUNTER — TELEPHONE (OUTPATIENT)
Dept: ONCOLOGY | Age: 87
End: 2022-06-21

## 2022-06-21 VITALS
WEIGHT: 183 LBS | BODY MASS INDEX: 28.66 KG/M2 | DIASTOLIC BLOOD PRESSURE: 65 MMHG | TEMPERATURE: 96.9 F | HEART RATE: 59 BPM | SYSTOLIC BLOOD PRESSURE: 117 MMHG

## 2022-06-21 DIAGNOSIS — C88.4 EXTRANODAL MARGINAL ZONE LYMPHOMA OF MUCOSA-ASSOCIATED LYMPHOID TISSUE (MALT) OF STOMACH (HCC): Primary | ICD-10-CM

## 2022-06-21 DIAGNOSIS — D12.6 TUBULAR ADENOMA OF COLON: ICD-10-CM

## 2022-06-21 PROCEDURE — 99211 OFF/OP EST MAY X REQ PHY/QHP: CPT | Performed by: INTERNAL MEDICINE

## 2022-06-21 PROCEDURE — 1123F ACP DISCUSS/DSCN MKR DOCD: CPT | Performed by: INTERNAL MEDICINE

## 2022-06-21 PROCEDURE — 99215 OFFICE O/P EST HI 40 MIN: CPT | Performed by: INTERNAL MEDICINE

## 2022-06-21 RX ORDER — ACETAMINOPHEN 325 MG/1
650 TABLET ORAL ONCE
Status: CANCELLED | OUTPATIENT
Start: 2022-06-29 | End: 2022-07-06

## 2022-06-21 RX ORDER — ALBUTEROL SULFATE 90 UG/1
4 AEROSOL, METERED RESPIRATORY (INHALATION) PRN
Status: CANCELLED | OUTPATIENT
Start: 2022-07-20

## 2022-06-21 RX ORDER — ONDANSETRON 2 MG/ML
8 INJECTION INTRAMUSCULAR; INTRAVENOUS
Status: CANCELLED | OUTPATIENT
Start: 2022-07-20

## 2022-06-21 RX ORDER — DIPHENHYDRAMINE HYDROCHLORIDE 50 MG/ML
25 INJECTION INTRAMUSCULAR; INTRAVENOUS ONCE
Status: CANCELLED | OUTPATIENT
Start: 2022-06-29 | End: 2022-07-06

## 2022-06-21 RX ORDER — DIPHENHYDRAMINE HYDROCHLORIDE 50 MG/ML
25 INJECTION INTRAMUSCULAR; INTRAVENOUS ONCE
Status: CANCELLED | OUTPATIENT
Start: 2022-07-13 | End: 2022-07-20

## 2022-06-21 RX ORDER — MEPERIDINE HYDROCHLORIDE 50 MG/ML
12.5 INJECTION INTRAMUSCULAR; INTRAVENOUS; SUBCUTANEOUS PRN
Status: CANCELLED | OUTPATIENT
Start: 2022-07-18

## 2022-06-21 RX ORDER — ONDANSETRON 2 MG/ML
8 INJECTION INTRAMUSCULAR; INTRAVENOUS
Status: CANCELLED | OUTPATIENT
Start: 2022-07-11

## 2022-06-21 RX ORDER — EPINEPHRINE 1 MG/ML
0.3 INJECTION, SOLUTION, CONCENTRATE INTRAVENOUS PRN
Status: CANCELLED | OUTPATIENT
Start: 2022-08-01

## 2022-06-21 RX ORDER — SODIUM CHLORIDE 0.9 % (FLUSH) 0.9 %
5-40 SYRINGE (ML) INJECTION PRN
Status: CANCELLED | OUTPATIENT
Start: 2022-07-11

## 2022-06-21 RX ORDER — SODIUM CHLORIDE 9 MG/ML
5-40 INJECTION INTRAVENOUS PRN
Status: CANCELLED | OUTPATIENT
Start: 2022-07-25

## 2022-06-21 RX ORDER — ACETAMINOPHEN 325 MG/1
650 TABLET ORAL
Status: CANCELLED | OUTPATIENT
Start: 2022-07-18

## 2022-06-21 RX ORDER — SODIUM CHLORIDE 9 MG/ML
5-250 INJECTION, SOLUTION INTRAVENOUS PRN
Status: CANCELLED | OUTPATIENT
Start: 2022-07-13

## 2022-06-21 RX ORDER — SODIUM CHLORIDE 9 MG/ML
5-250 INJECTION, SOLUTION INTRAVENOUS PRN
Status: CANCELLED | OUTPATIENT
Start: 2022-07-20

## 2022-06-21 RX ORDER — ACETAMINOPHEN 325 MG/1
650 TABLET ORAL
Status: CANCELLED | OUTPATIENT
Start: 2022-06-29

## 2022-06-21 RX ORDER — SODIUM CHLORIDE 0.9 % (FLUSH) 0.9 %
5-40 SYRINGE (ML) INJECTION PRN
Status: CANCELLED | OUTPATIENT
Start: 2022-07-25

## 2022-06-21 RX ORDER — ONDANSETRON 2 MG/ML
8 INJECTION INTRAMUSCULAR; INTRAVENOUS
Status: CANCELLED | OUTPATIENT
Start: 2022-07-18

## 2022-06-21 RX ORDER — SODIUM CHLORIDE 9 MG/ML
INJECTION, SOLUTION INTRAVENOUS CONTINUOUS
Status: CANCELLED | OUTPATIENT
Start: 2022-07-06

## 2022-06-21 RX ORDER — SODIUM CHLORIDE 9 MG/ML
5-250 INJECTION, SOLUTION INTRAVENOUS PRN
Status: CANCELLED | OUTPATIENT
Start: 2022-07-25

## 2022-06-21 RX ORDER — SODIUM CHLORIDE 9 MG/ML
5-40 INJECTION INTRAVENOUS PRN
Status: CANCELLED | OUTPATIENT
Start: 2022-08-01

## 2022-06-21 RX ORDER — MEPERIDINE HYDROCHLORIDE 50 MG/ML
12.5 INJECTION INTRAMUSCULAR; INTRAVENOUS; SUBCUTANEOUS PRN
Status: CANCELLED | OUTPATIENT
Start: 2022-06-29

## 2022-06-21 RX ORDER — DIPHENHYDRAMINE HYDROCHLORIDE 50 MG/ML
50 INJECTION INTRAMUSCULAR; INTRAVENOUS
Status: CANCELLED | OUTPATIENT
Start: 2022-07-20

## 2022-06-21 RX ORDER — MEPERIDINE HYDROCHLORIDE 50 MG/ML
12.5 INJECTION INTRAMUSCULAR; INTRAVENOUS; SUBCUTANEOUS PRN
Status: CANCELLED | OUTPATIENT
Start: 2022-07-20

## 2022-06-21 RX ORDER — SODIUM CHLORIDE 9 MG/ML
5-250 INJECTION, SOLUTION INTRAVENOUS PRN
Status: CANCELLED | OUTPATIENT
Start: 2022-07-18

## 2022-06-21 RX ORDER — ONDANSETRON 2 MG/ML
8 INJECTION INTRAMUSCULAR; INTRAVENOUS
Status: CANCELLED | OUTPATIENT
Start: 2022-07-25

## 2022-06-21 RX ORDER — HEPARIN SODIUM (PORCINE) LOCK FLUSH IV SOLN 100 UNIT/ML 100 UNIT/ML
500 SOLUTION INTRAVENOUS PRN
Status: CANCELLED | OUTPATIENT
Start: 2022-07-20

## 2022-06-21 RX ORDER — HEPARIN SODIUM (PORCINE) LOCK FLUSH IV SOLN 100 UNIT/ML 100 UNIT/ML
500 SOLUTION INTRAVENOUS PRN
Status: CANCELLED | OUTPATIENT
Start: 2022-07-11

## 2022-06-21 RX ORDER — SODIUM CHLORIDE 9 MG/ML
5-40 INJECTION INTRAVENOUS PRN
Status: CANCELLED | OUTPATIENT
Start: 2022-07-06

## 2022-06-21 RX ORDER — ONDANSETRON 2 MG/ML
8 INJECTION INTRAMUSCULAR; INTRAVENOUS
Status: CANCELLED | OUTPATIENT
Start: 2022-07-06

## 2022-06-21 RX ORDER — ACETAMINOPHEN 325 MG/1
650 TABLET ORAL
Status: CANCELLED | OUTPATIENT
Start: 2022-08-01

## 2022-06-21 RX ORDER — FAMOTIDINE 10 MG/ML
20 INJECTION, SOLUTION INTRAVENOUS
Status: CANCELLED | OUTPATIENT
Start: 2022-07-13

## 2022-06-21 RX ORDER — ALBUTEROL SULFATE 90 UG/1
4 AEROSOL, METERED RESPIRATORY (INHALATION) PRN
Status: CANCELLED | OUTPATIENT
Start: 2022-07-11

## 2022-06-21 RX ORDER — SODIUM CHLORIDE 9 MG/ML
INJECTION, SOLUTION INTRAVENOUS CONTINUOUS
Status: CANCELLED | OUTPATIENT
Start: 2022-06-29

## 2022-06-21 RX ORDER — ALBUTEROL SULFATE 90 UG/1
4 AEROSOL, METERED RESPIRATORY (INHALATION) PRN
Status: CANCELLED | OUTPATIENT
Start: 2022-07-18

## 2022-06-21 RX ORDER — MEPERIDINE HYDROCHLORIDE 50 MG/ML
12.5 INJECTION INTRAMUSCULAR; INTRAVENOUS; SUBCUTANEOUS PRN
Status: CANCELLED | OUTPATIENT
Start: 2022-07-13

## 2022-06-21 RX ORDER — ACETAMINOPHEN 325 MG/1
650 TABLET ORAL
Status: CANCELLED | OUTPATIENT
Start: 2022-07-25

## 2022-06-21 RX ORDER — HEPARIN SODIUM (PORCINE) LOCK FLUSH IV SOLN 100 UNIT/ML 100 UNIT/ML
500 SOLUTION INTRAVENOUS PRN
Status: CANCELLED | OUTPATIENT
Start: 2022-06-29

## 2022-06-21 RX ORDER — HEPARIN SODIUM (PORCINE) LOCK FLUSH IV SOLN 100 UNIT/ML 100 UNIT/ML
500 SOLUTION INTRAVENOUS PRN
Status: CANCELLED | OUTPATIENT
Start: 2022-07-25

## 2022-06-21 RX ORDER — SODIUM CHLORIDE 0.9 % (FLUSH) 0.9 %
5-40 SYRINGE (ML) INJECTION PRN
Status: CANCELLED | OUTPATIENT
Start: 2022-07-06

## 2022-06-21 RX ORDER — DIPHENHYDRAMINE HYDROCHLORIDE 50 MG/ML
25 INJECTION INTRAMUSCULAR; INTRAVENOUS ONCE
Status: CANCELLED | OUTPATIENT
Start: 2022-08-01 | End: 2022-08-01

## 2022-06-21 RX ORDER — SODIUM CHLORIDE 9 MG/ML
5-250 INJECTION, SOLUTION INTRAVENOUS PRN
Status: CANCELLED | OUTPATIENT
Start: 2022-07-11

## 2022-06-21 RX ORDER — DIPHENHYDRAMINE HYDROCHLORIDE 50 MG/ML
50 INJECTION INTRAMUSCULAR; INTRAVENOUS
Status: CANCELLED | OUTPATIENT
Start: 2022-08-01

## 2022-06-21 RX ORDER — ALBUTEROL SULFATE 90 UG/1
4 AEROSOL, METERED RESPIRATORY (INHALATION) PRN
Status: CANCELLED | OUTPATIENT
Start: 2022-07-25

## 2022-06-21 RX ORDER — ACETAMINOPHEN 325 MG/1
650 TABLET ORAL ONCE
Status: CANCELLED | OUTPATIENT
Start: 2022-07-13 | End: 2022-07-20

## 2022-06-21 RX ORDER — ONDANSETRON 2 MG/ML
8 INJECTION INTRAMUSCULAR; INTRAVENOUS
Status: CANCELLED | OUTPATIENT
Start: 2022-08-01

## 2022-06-21 RX ORDER — FAMOTIDINE 10 MG/ML
20 INJECTION, SOLUTION INTRAVENOUS
Status: CANCELLED | OUTPATIENT
Start: 2022-07-20

## 2022-06-21 RX ORDER — SODIUM CHLORIDE 9 MG/ML
5-250 INJECTION, SOLUTION INTRAVENOUS PRN
Status: CANCELLED | OUTPATIENT
Start: 2022-06-29

## 2022-06-21 RX ORDER — SODIUM CHLORIDE 9 MG/ML
5-250 INJECTION, SOLUTION INTRAVENOUS PRN
Status: CANCELLED | OUTPATIENT
Start: 2022-08-01

## 2022-06-21 RX ORDER — FAMOTIDINE 10 MG/ML
20 INJECTION, SOLUTION INTRAVENOUS
Status: CANCELLED | OUTPATIENT
Start: 2022-07-11

## 2022-06-21 RX ORDER — SODIUM CHLORIDE 9 MG/ML
5-250 INJECTION, SOLUTION INTRAVENOUS PRN
Status: CANCELLED | OUTPATIENT
Start: 2022-07-06

## 2022-06-21 RX ORDER — ACETAMINOPHEN 325 MG/1
650 TABLET ORAL ONCE
Status: CANCELLED | OUTPATIENT
Start: 2022-07-25 | End: 2022-07-25

## 2022-06-21 RX ORDER — DIPHENHYDRAMINE HYDROCHLORIDE 50 MG/ML
25 INJECTION INTRAMUSCULAR; INTRAVENOUS ONCE
Status: CANCELLED | OUTPATIENT
Start: 2022-07-11 | End: 2022-07-11

## 2022-06-21 RX ORDER — ALBUTEROL SULFATE 90 UG/1
4 AEROSOL, METERED RESPIRATORY (INHALATION) PRN
Status: CANCELLED | OUTPATIENT
Start: 2022-07-06

## 2022-06-21 RX ORDER — ACETAMINOPHEN 325 MG/1
650 TABLET ORAL
Status: CANCELLED | OUTPATIENT
Start: 2022-07-20

## 2022-06-21 RX ORDER — SODIUM CHLORIDE 0.9 % (FLUSH) 0.9 %
5-40 SYRINGE (ML) INJECTION PRN
Status: CANCELLED | OUTPATIENT
Start: 2022-07-20

## 2022-06-21 RX ORDER — ONDANSETRON 2 MG/ML
8 INJECTION INTRAMUSCULAR; INTRAVENOUS
Status: CANCELLED | OUTPATIENT
Start: 2022-07-13

## 2022-06-21 RX ORDER — ACETAMINOPHEN 325 MG/1
650 TABLET ORAL
Status: CANCELLED | OUTPATIENT
Start: 2022-07-13

## 2022-06-21 RX ORDER — SODIUM CHLORIDE 9 MG/ML
5-40 INJECTION INTRAVENOUS PRN
Status: CANCELLED | OUTPATIENT
Start: 2022-07-18

## 2022-06-21 RX ORDER — FAMOTIDINE 10 MG/ML
20 INJECTION, SOLUTION INTRAVENOUS
Status: CANCELLED | OUTPATIENT
Start: 2022-08-01

## 2022-06-21 RX ORDER — DIPHENHYDRAMINE HYDROCHLORIDE 50 MG/ML
25 INJECTION INTRAMUSCULAR; INTRAVENOUS ONCE
Status: CANCELLED | OUTPATIENT
Start: 2022-07-25 | End: 2022-07-25

## 2022-06-21 RX ORDER — EPINEPHRINE 1 MG/ML
0.3 INJECTION, SOLUTION, CONCENTRATE INTRAVENOUS PRN
Status: CANCELLED | OUTPATIENT
Start: 2022-07-13

## 2022-06-21 RX ORDER — SODIUM CHLORIDE 9 MG/ML
INJECTION, SOLUTION INTRAVENOUS CONTINUOUS
Status: CANCELLED | OUTPATIENT
Start: 2022-07-13

## 2022-06-21 RX ORDER — ACETAMINOPHEN 325 MG/1
650 TABLET ORAL
Status: CANCELLED | OUTPATIENT
Start: 2022-07-11

## 2022-06-21 RX ORDER — ACETAMINOPHEN 325 MG/1
650 TABLET ORAL ONCE
Status: CANCELLED | OUTPATIENT
Start: 2022-07-18 | End: 2022-07-18

## 2022-06-21 RX ORDER — MEPERIDINE HYDROCHLORIDE 50 MG/ML
12.5 INJECTION INTRAMUSCULAR; INTRAVENOUS; SUBCUTANEOUS PRN
Status: CANCELLED | OUTPATIENT
Start: 2022-07-06

## 2022-06-21 RX ORDER — HEPARIN SODIUM (PORCINE) LOCK FLUSH IV SOLN 100 UNIT/ML 100 UNIT/ML
500 SOLUTION INTRAVENOUS PRN
Status: CANCELLED | OUTPATIENT
Start: 2022-07-13

## 2022-06-21 RX ORDER — SODIUM CHLORIDE 9 MG/ML
INJECTION, SOLUTION INTRAVENOUS CONTINUOUS
Status: CANCELLED | OUTPATIENT
Start: 2022-07-18

## 2022-06-21 RX ORDER — DIPHENHYDRAMINE HYDROCHLORIDE 50 MG/ML
25 INJECTION INTRAMUSCULAR; INTRAVENOUS ONCE
Status: CANCELLED | OUTPATIENT
Start: 2022-07-06 | End: 2022-07-13

## 2022-06-21 RX ORDER — SODIUM CHLORIDE 9 MG/ML
INJECTION, SOLUTION INTRAVENOUS CONTINUOUS
Status: CANCELLED | OUTPATIENT
Start: 2022-07-20

## 2022-06-21 RX ORDER — ALBUTEROL SULFATE 90 UG/1
4 AEROSOL, METERED RESPIRATORY (INHALATION) PRN
Status: CANCELLED | OUTPATIENT
Start: 2022-07-13

## 2022-06-21 RX ORDER — EPINEPHRINE 1 MG/ML
0.3 INJECTION, SOLUTION, CONCENTRATE INTRAVENOUS PRN
Status: CANCELLED | OUTPATIENT
Start: 2022-07-11

## 2022-06-21 RX ORDER — DIPHENHYDRAMINE HYDROCHLORIDE 50 MG/ML
50 INJECTION INTRAMUSCULAR; INTRAVENOUS
Status: CANCELLED | OUTPATIENT
Start: 2022-07-18

## 2022-06-21 RX ORDER — DIPHENHYDRAMINE HYDROCHLORIDE 50 MG/ML
50 INJECTION INTRAMUSCULAR; INTRAVENOUS
Status: CANCELLED | OUTPATIENT
Start: 2022-07-11

## 2022-06-21 RX ORDER — ACETAMINOPHEN 325 MG/1
650 TABLET ORAL ONCE
Status: CANCELLED | OUTPATIENT
Start: 2022-08-01 | End: 2022-08-01

## 2022-06-21 RX ORDER — DIPHENHYDRAMINE HYDROCHLORIDE 50 MG/ML
25 INJECTION INTRAMUSCULAR; INTRAVENOUS ONCE
Status: CANCELLED | OUTPATIENT
Start: 2022-07-18 | End: 2022-07-18

## 2022-06-21 RX ORDER — SODIUM CHLORIDE 9 MG/ML
5-40 INJECTION INTRAVENOUS PRN
Status: CANCELLED | OUTPATIENT
Start: 2022-07-20

## 2022-06-21 RX ORDER — EPINEPHRINE 1 MG/ML
0.3 INJECTION, SOLUTION, CONCENTRATE INTRAVENOUS PRN
Status: CANCELLED | OUTPATIENT
Start: 2022-07-18

## 2022-06-21 RX ORDER — SODIUM CHLORIDE 0.9 % (FLUSH) 0.9 %
5-40 SYRINGE (ML) INJECTION PRN
Status: CANCELLED | OUTPATIENT
Start: 2022-06-29

## 2022-06-21 RX ORDER — DIPHENHYDRAMINE HYDROCHLORIDE 50 MG/ML
50 INJECTION INTRAMUSCULAR; INTRAVENOUS
Status: CANCELLED | OUTPATIENT
Start: 2022-07-13

## 2022-06-21 RX ORDER — DIPHENHYDRAMINE HYDROCHLORIDE 50 MG/ML
50 INJECTION INTRAMUSCULAR; INTRAVENOUS
Status: CANCELLED | OUTPATIENT
Start: 2022-07-25

## 2022-06-21 RX ORDER — ACETAMINOPHEN 325 MG/1
650 TABLET ORAL
Status: CANCELLED | OUTPATIENT
Start: 2022-07-06

## 2022-06-21 RX ORDER — SODIUM CHLORIDE 9 MG/ML
5-40 INJECTION INTRAVENOUS PRN
Status: CANCELLED | OUTPATIENT
Start: 2022-06-29

## 2022-06-21 RX ORDER — FAMOTIDINE 10 MG/ML
20 INJECTION, SOLUTION INTRAVENOUS
Status: CANCELLED | OUTPATIENT
Start: 2022-07-25

## 2022-06-21 RX ORDER — ACETAMINOPHEN 325 MG/1
650 TABLET ORAL ONCE
Status: CANCELLED | OUTPATIENT
Start: 2022-07-06 | End: 2022-07-13

## 2022-06-21 RX ORDER — DIPHENHYDRAMINE HYDROCHLORIDE 50 MG/ML
50 INJECTION INTRAMUSCULAR; INTRAVENOUS
Status: CANCELLED | OUTPATIENT
Start: 2022-06-29

## 2022-06-21 RX ORDER — MEPERIDINE HYDROCHLORIDE 50 MG/ML
12.5 INJECTION INTRAMUSCULAR; INTRAVENOUS; SUBCUTANEOUS PRN
Status: CANCELLED | OUTPATIENT
Start: 2022-07-11

## 2022-06-21 RX ORDER — FAMOTIDINE 10 MG/ML
20 INJECTION, SOLUTION INTRAVENOUS
Status: CANCELLED | OUTPATIENT
Start: 2022-06-29

## 2022-06-21 RX ORDER — SODIUM CHLORIDE 9 MG/ML
INJECTION, SOLUTION INTRAVENOUS CONTINUOUS
Status: CANCELLED | OUTPATIENT
Start: 2022-07-25

## 2022-06-21 RX ORDER — SODIUM CHLORIDE 9 MG/ML
INJECTION, SOLUTION INTRAVENOUS CONTINUOUS
Status: CANCELLED | OUTPATIENT
Start: 2022-07-11

## 2022-06-21 RX ORDER — SODIUM CHLORIDE 0.9 % (FLUSH) 0.9 %
5-40 SYRINGE (ML) INJECTION PRN
Status: CANCELLED | OUTPATIENT
Start: 2022-07-18

## 2022-06-21 RX ORDER — ONDANSETRON 2 MG/ML
8 INJECTION INTRAMUSCULAR; INTRAVENOUS
Status: CANCELLED | OUTPATIENT
Start: 2022-06-29

## 2022-06-21 RX ORDER — HEPARIN SODIUM (PORCINE) LOCK FLUSH IV SOLN 100 UNIT/ML 100 UNIT/ML
500 SOLUTION INTRAVENOUS PRN
Status: CANCELLED | OUTPATIENT
Start: 2022-07-18

## 2022-06-21 RX ORDER — ACETAMINOPHEN 325 MG/1
650 TABLET ORAL ONCE
Status: CANCELLED | OUTPATIENT
Start: 2022-07-11 | End: 2022-07-11

## 2022-06-21 RX ORDER — SODIUM CHLORIDE 9 MG/ML
5-40 INJECTION INTRAVENOUS PRN
Status: CANCELLED | OUTPATIENT
Start: 2022-07-11

## 2022-06-21 RX ORDER — EPINEPHRINE 1 MG/ML
0.3 INJECTION, SOLUTION, CONCENTRATE INTRAVENOUS PRN
Status: CANCELLED | OUTPATIENT
Start: 2022-07-25

## 2022-06-21 RX ORDER — DIPHENHYDRAMINE HYDROCHLORIDE 50 MG/ML
50 INJECTION INTRAMUSCULAR; INTRAVENOUS
Status: CANCELLED | OUTPATIENT
Start: 2022-07-06

## 2022-06-21 RX ORDER — EPINEPHRINE 1 MG/ML
0.3 INJECTION, SOLUTION, CONCENTRATE INTRAVENOUS PRN
Status: CANCELLED | OUTPATIENT
Start: 2022-07-20

## 2022-06-21 RX ORDER — SODIUM CHLORIDE 9 MG/ML
INJECTION, SOLUTION INTRAVENOUS CONTINUOUS
Status: CANCELLED | OUTPATIENT
Start: 2022-08-01

## 2022-06-21 RX ORDER — ALBUTEROL SULFATE 90 UG/1
4 AEROSOL, METERED RESPIRATORY (INHALATION) PRN
Status: CANCELLED | OUTPATIENT
Start: 2022-08-01

## 2022-06-21 RX ORDER — MEPERIDINE HYDROCHLORIDE 50 MG/ML
12.5 INJECTION INTRAMUSCULAR; INTRAVENOUS; SUBCUTANEOUS PRN
Status: CANCELLED | OUTPATIENT
Start: 2022-07-25

## 2022-06-21 RX ORDER — ALBUTEROL SULFATE 90 UG/1
4 AEROSOL, METERED RESPIRATORY (INHALATION) PRN
Status: CANCELLED | OUTPATIENT
Start: 2022-06-29

## 2022-06-21 RX ORDER — SODIUM CHLORIDE 9 MG/ML
5-40 INJECTION INTRAVENOUS PRN
Status: CANCELLED | OUTPATIENT
Start: 2022-07-13

## 2022-06-21 RX ORDER — ACETAMINOPHEN 325 MG/1
650 TABLET ORAL ONCE
Status: CANCELLED | OUTPATIENT
Start: 2022-07-20 | End: 2022-07-27

## 2022-06-21 RX ORDER — SODIUM CHLORIDE 0.9 % (FLUSH) 0.9 %
5-40 SYRINGE (ML) INJECTION PRN
Status: CANCELLED | OUTPATIENT
Start: 2022-07-13

## 2022-06-21 RX ORDER — DIPHENHYDRAMINE HYDROCHLORIDE 50 MG/ML
25 INJECTION INTRAMUSCULAR; INTRAVENOUS ONCE
Status: CANCELLED | OUTPATIENT
Start: 2022-07-20 | End: 2022-07-27

## 2022-06-21 RX ORDER — EPINEPHRINE 1 MG/ML
0.3 INJECTION, SOLUTION, CONCENTRATE INTRAVENOUS PRN
Status: CANCELLED | OUTPATIENT
Start: 2022-07-06

## 2022-06-21 RX ORDER — HEPARIN SODIUM (PORCINE) LOCK FLUSH IV SOLN 100 UNIT/ML 100 UNIT/ML
500 SOLUTION INTRAVENOUS PRN
Status: CANCELLED | OUTPATIENT
Start: 2022-08-01

## 2022-06-21 RX ORDER — EPINEPHRINE 1 MG/ML
0.3 INJECTION, SOLUTION, CONCENTRATE INTRAVENOUS PRN
Status: CANCELLED | OUTPATIENT
Start: 2022-06-29

## 2022-06-21 RX ORDER — FAMOTIDINE 10 MG/ML
20 INJECTION, SOLUTION INTRAVENOUS
Status: CANCELLED | OUTPATIENT
Start: 2022-07-18

## 2022-06-21 RX ORDER — HEPARIN SODIUM (PORCINE) LOCK FLUSH IV SOLN 100 UNIT/ML 100 UNIT/ML
500 SOLUTION INTRAVENOUS PRN
Status: CANCELLED | OUTPATIENT
Start: 2022-07-06

## 2022-06-21 RX ORDER — SODIUM CHLORIDE 0.9 % (FLUSH) 0.9 %
5-40 SYRINGE (ML) INJECTION PRN
Status: CANCELLED | OUTPATIENT
Start: 2022-08-01

## 2022-06-21 RX ORDER — MEPERIDINE HYDROCHLORIDE 50 MG/ML
12.5 INJECTION INTRAMUSCULAR; INTRAVENOUS; SUBCUTANEOUS PRN
Status: CANCELLED | OUTPATIENT
Start: 2022-08-01

## 2022-06-21 RX ORDER — FAMOTIDINE 10 MG/ML
20 INJECTION, SOLUTION INTRAVENOUS
Status: CANCELLED | OUTPATIENT
Start: 2022-07-06

## 2022-06-21 NOTE — LETTER
[facility] [address] [id number]  Phone: [phone]    Parvez Albarran MD    June 21, 2022     Chay Muse [address] [place] [address] [address] [facility]  [address] [place] [phone]    Patient: Andrey Oleary   MR Number: [id number]   YOB: 1933   Date of Visit: 6/21/2022       Dear Shara Coronel: Thank you for referring Andrey Oleary to me for evaluation/treatment. Below are the relevant portions of my assessment and plan of care. If you have questions, please do not hesitate to call me. I look forward to following Idalia Garner along with you.     Sincerely,      Parvez Albarran MD

## 2022-06-21 NOTE — TELEPHONE ENCOUNTER
Name: Rosalia Salas  : 9/3/1933  MRN: [id number]    Oncology Navigation- Initial Note:    Intake-  Contact Type: Telephone  Notes: Introductory phone call made to patient, pt's spouse, Collin Medina answered. Instructed Alberta Ring will be navigating oncology care & may call writer with any questions/concerns @ [phone] prn. Discussed potential barriers to care, Collin Medina stated pt uses cane & at times walker. Inquired on smoking hx, Collin Medina stated pt never smoked. Inquired on alcohol/drug use, Collin Medina stated pt drinks 1-2 \"cocktails\" per week & denied drug use. Collin Medina denied questions/concerns. [address] written materials along with writer's business card mailed to patient. Will continue to follow.     Electronically signed by Marquise Shannon RN on 2022 at 12:11 PM

## 2022-06-21 NOTE — PROGRESS NOTES
Patient ID: Modesto Sharma, 9/3/1933, [id number], 80 y.o.   Referred by :  Fransisco Romero MD    Reason for consultation: Gastric marginal zone lymphoma      HISTORY OF PRESENT ILLNESS:    Oncologic History:    Modesto Sharma is a very pleasant 80 y.o. 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 [facility] with no radiation  Imaging was done including CT abdomen pelvis and MRI and no evidence of metastasis/cancer/lymphadenopathy  PET/CT and no evidence of lymphoma activity elsewhere other than stomach  There was diffuse low-level activity throughout the stomach and the small bowel without CT abnormality  Patient visited with radiation oncology and he decided not to get radiation        Interim history  Patient had no new symptoms  Long discussion about Rituxan  Review echo results with him    Past Medical History:   Diagnosis Date    Bilateral edema of lower extremity 1995    Bilateral hearing loss     wears hearing aids    Diverticulosis of sigmoid colon     Gout     History of bladder cancer 1980    Dr. Mona Reeves yearly cysto    History of colon polyps 09/10/2012    History of hypokalemia     History of melanoma 2008    left cheek    History of prostate cancer 1992    S/P Prostatectomy    Hyperlipidemia     Hypertension     Tubular adenoma of colon 09/10/2012       Past Surgical History:   Procedure Laterality Date    COLONOSCOPY  2005    dr Juwan Rivera  09/10/2012    significant diverticulosis, tubular adenoma-sigmoid colon, small hemorrhoids    COLONOSCOPY  10/03/2016    significant diverticulosis sigmoid colon, small polyp rectum-hyperplastic    EYE SURGERY Bilateral 1994    cataracts removed    HERNIA REPAIR Right 1956    inguinal    HERNIA REPAIR Left 1995    inguinal    JOINT REPLACEMENT Right 1984    great toe joint D/T gout    KNEE ARTHROPLASTY Left 2000    meniscal repair    PROSTATE SURGERY  2002       Allergies   Allergen Reactions    Mirabegron      Elevated blood pressure       Current Outpatient Medications   Medication Sig Dispense Refill    apixaban (ELIQUIS) 5 MG TABS tablet Take 5 mg by mouth 2 times daily      furosemide (LASIX) 20 MG tablet Take 20 mg by mouth daily as needed for Other      pantoprazole (PROTONIX) 40 MG tablet Take 1 tablet by mouth daily 90 tablet 1    magnesium oxide (MAG-OX) 400 MG tablet Take 1 tablet by mouth daily 90 tablet 1    amLODIPine (NORVASC) 5 MG tablet TAKE 1 TABLET BY MOUTH ONCE DAILY 90 tablet 1    latanoprost (XALATAN) 0.005 % ophthalmic solution INSTILL 1 DROP INTO EACH EYE NIGHTLY 3 each 1    atorvastatin (LIPITOR) 20 MG tablet Take 1 tablet by mouth daily 90 tablet 1    gabapentin (NEURONTIN) 100 MG capsule Take 1 capsule at night 90 capsule 1    lisinopril (PRINIVIL;ZESTRIL) 40 MG tablet Take 1 tablet by mouth daily 90 tablet 1    allopurinol (ZYLOPRIM) 300 MG tablet Take 1 tablet by mouth daily 90 tablet 1    timolol (TIMOPTIC) 0.5 % ophthalmic solution Place 1 drop into the left eye daily 3 each 1    olopatadine (PATANASE) 0.6 % SOLN nassl soln SPRAY 2 SPRAY(S) IN EACH NOSTRIL NIGHTLY 3 each 1    hydroCHLOROthiazide (HYDRODIURIL) 25 MG tablet Take 1 tablet by mouth daily 90 tablet 1    ipratropium (ATROVENT) 0.03 % nasal spray 2 sprays by Nasal route 3 times daily 3 each 1    potassium chloride (KLOR-CON M) 10 MEQ extended release tablet Take 1 tablet by mouth 2 times daily 30 tablet 0    potassium chloride (KLOR-CON M) 20 MEQ extended release tablet Take 1 tablet by mouth daily (Patient not taking: Reported on 5/24/2022) 90 tablet 1    lidocaine 4 % external patch Place 1 patch onto the skin daily (Patient not taking: Reported on 5/24/2022) 30 patch 2    meloxicam (MOBIC) 15 MG tablet Take 1 tablet by mouth daily (Patient not taking: Reported on 5/24/2022) 90 tablet 1    tamsulosin (FLOMAX) 0.4 MG capsule Take 1 capsule by mouth daily (Patient not taking: Reported on 7/7/2021) 90 capsule 3     No current facility-administered medications for this visit. Social History     Socioeconomic History    Marital status:      Spouse name: Not on file    Number of children: Not on file    Years of education: Not on file    Highest education level: Not on file   Occupational History    Not on file   Tobacco Use    Smoking status: Never Smoker    Smokeless tobacco: Never Used   Vaping Use    Vaping Use: Never used   Substance and Sexual Activity    Alcohol use: Yes     Alcohol/week: 10.0 standard drinks     Types: 10 Shots of liquor per week    Drug use: No    Sexual activity: Not on file   Other Topics Concern    Not on file   Social History Narrative    Not on file     Social Determinants of Health     Financial Resource Strain: Low Risk     Difficulty of Paying Living Expenses: Not hard at all   Food Insecurity: No Food Insecurity    Worried About [address] in the Last Year: Never true    [facility] in the Last Year: Never true   Transportation Needs:     Lack of Transportation (Medical): Not on file    Lack of Transportation (Non-Medical):  Not on file   Physical Activity:     Days of Exercise per Week: Not on file    Minutes of Exercise per Session: Not on file Stress:     Feeling of Stress : Not on file   Social Connections:     Frequency of Communication with Friends and Family: Not on file    Frequency of Social Gatherings with Friends and Family: Not on file    Attends Zoroastrian Services: Not on file    Active Member of [address] [place] [address] or Organizations: Not on file    Attends Club or Organization Meetings: Not on file    Marital Status: Not on file   Intimate Partner Violence:     Fear of Current or Ex-Partner: Not on file    Emotionally Abused: Not on file    Physically Abused: Not on file    Sexually Abused: Not on file   Housing Stability:     Unable to Pay for Housing in the Last Year: Not on file    Number of [place] in the Last Year: Not on file    Unstable Housing in the Last Year: Not on file       Family History   Problem Relation Age of Onset    Heart Attack Mother     Heart Attack Father         REVIEW OF SYSTEM:     Constitutional: No fever or chills. No night sweats, no weight loss   Eyes: No eye discharge, double vision, or eye pain   HEENT: negative for sore mouth, sore throat, hoarseness and voice change   Respiratory: negative for cough , sputum, dyspnea, wheezing, hemoptysis, chest pain   Cardiovascular: negative for chest pain, dyspnea, palpitations, orthopnea, PND   Gastrointestinal: negative for nausea, vomiting, diarrhea, constipation, abdominal pain, Dysphagia, hematemesis and hematochezia   Genitourinary: negative for frequency, dysuria, nocturia, urinary incontinence, and hematuria   Integument: negative for rash, skin lesions, bruises.    Hematologic/Lymphatic: negative for easy bruising, bleeding, lymphadenopathy, petechiae and swelling/edema   Endocrine: negative for heat or cold intolerance, tremor, weight changes, change in bowel habits and hair loss   Musculoskeletal: negative for myalgias, arthralgias, pain, joint swelling,and bone pain   Neurological: negative for headaches, dizziness, seizures, weakness, numbness OBJECTIVE:         Vitals:    06/21/22 0935   BP: 117/65   Pulse: 59   Temp: 96.9 °F (36.1 °C)       PHYSICAL EXAM:   General appearance - well appearing, no in pain or distress   Mental status - alert and cooperative   Eyes - pupils equal and reactive, extraocular eye movements intact   Ears - bilateral TM's and external ear canals normal   Mouth - mucous membranes moist, pharynx normal without lesions   Neck - supple, no significant adenopathy   Lymphatics - no palpable lymphadenopathy, no hepatosplenomegaly   Chest - clear to auscultation, no wheezes, rales or rhonchi, symmetric air entry   Heart - normal rate, regular rhythm, normal S1, S2, no murmurs, rubs, clicks or gallops   Abdomen - soft, nontender, nondistended, no masses or organomegaly   Neurological - alert, oriented, normal speech, no focal findings or movement disorder noted   Musculoskeletal - no joint tenderness, deformity or swelling   Extremities - peripheral pulses normal, no pedal edema, no clubbing or cyanosis   Skin - normal coloration and turgor, no rashes, no suspicious skin lesions noted ,      LABORATORY DATA:     Lab Results   Component Value Date    WBC 8.9 04/19/2022    HGB 13.8 04/19/2022    HCT 41.2 04/19/2022    MCV 93.5 04/19/2022     04/19/2022    LYMPHOPCT 11 (L) 04/19/2022    RBC 4.40 (L) 04/19/2022    MCH 31.4 04/19/2022    MCHC 33.6 04/19/2022    RDW 14.4 04/19/2022    NEUTOPHILPCT 72.9 07/23/2020    MONOPCT 12 (H) 04/19/2022    EOSPCT 0.2 07/23/2020    BASOPCT 1 04/19/2022    NEUTROABS 6.50 04/19/2022    LYMPHSABS 1.00 04/19/2022    MONOSABS 1.10 04/19/2022    EOSABS 0.30 04/19/2022    BASOSABS 0.10 04/19/2022         Chemistry        Component Value Date/Time     04/19/2022 1422    K 4.4 04/19/2022 1422     04/19/2022 1422    CO2 26 04/19/2022 1422    BUN 28 (H) 04/19/2022 1422    CREATININE 1.31 (H) 04/19/2022 1422        Component Value Date/Time    CALCIUM 9.3 04/19/2022 1422    ALKPHOS 182 (H) 04/19/2022 1422    AST 20 04/19/2022 1422    ALT 12 04/19/2022 1422    BILITOT 1.12 04/19/2022 1422            PATHOLOGY DATA:         IMAGING DATA:      Echocardiogram complete  [facility]    Transthoracic Echocardiography Report (TTE)     Patient Name Yamileth Barlow Date of Study               06/01/2022      Date of      09/03/1933  Gender                      Male   Birth      Age          80 year(s)  Race                              Room Number              Height:                     67 inch, 170.18 cm      Corporate ID [id number]    Weight:                     182 pounds, 82.6 kg   #      Patient Acct [de-identified]   BSA:          1.94 m^2      BMI:      28.51   #                                                              kg/m^2      MR #         [id number]      Sonographer                 Laxmi Preciado      Accession #  [id number]  Interpreting Physician      [address]      Fellow                   Referring Nurse                            Practitioner      Interpreting             Referring Physician         Rick Moser MD   Fellow     Type of Study      TTE procedure:2D Echocardiogram, M-Mode, Doppler, Color Doppler. Procedure Date  Date: 06/01/2022 Start: 11:00 AM    Study Location: [address] [place] [address]  Technical Quality: Fair visualization    Indications:Cardiomyopathy-Unspecified. Patient Status: Outpatient    Height: 67 inches Weight: 182 pounds BSA: 1.94 m^2 BMI: 28.51 kg/m^2    Rhythm: Sinus bradycardia HR: 49 bpm BP: 115/67 mmHg    CONCLUSIONS    Summary  Technically difficult study. Left ventricle is normal in size and wall thickness. Global left ventricular systolic function is normal. Estimated LV EF 65-70  %. No obvious wall motion abnormality seen. Evidence of severe (grade III)  diastolic dysfunction. Left atrium is severely dilated. Right atrium is moderately dilated . Normal right ventricular size and function. Mild aortic insufficiency.   Mild tricuspid regurgitation. No pulmonary hypertension. Estimated right ventricular systolic pressure is  18QYXR. Aortic root dimension is at upper limit of normal.    Signature  ----------------------------------------------------------------------------   Electronically signed by Laxmi Preciado(Sonographer) on 06/01/2022 05:05   PM  ----------------------------------------------------------------------------    ----------------------------------------------------------------------------   Electronically signed by Andres Stafford(Interpreting physician) on 06/02/2022   09:42 AM  ----------------------------------------------------------------------------  FINDINGS  Left Atrium  Left atrium is severely dilated. Left Ventricle  Left ventricle is normal in size and wall thickness. Global left ventricular systolic function is normal.  Estimated LV EF 65-70 %. No obvious wall motion abnormality seen. Evidence of severe (grade III) diastolic dysfunction. Right Atrium  Right atrium is moderately dilated . Right Ventricle  Normal right ventricular size and function. Mitral Valve  No obvious valvular abnormality seen. No evidence of mitral regurgitation. Aortic Valve  No obvious valvular abnormality seen. Mild aortic insufficiency. Tricuspid Valve  No obvious valvular abnormality. Mild tricuspid regurgitation. No pulmonary hypertension. Estimated right ventricular systolic pressure is  97YKAC. Pulmonic Valve  Pulmonic valve was not well visualized. No evidence of pulmonic insufficiency or stenosis. Pericardial Effusion  No significant pericardial effusion is seen. Pleural Effusion  No pleural effusion seen. Miscellaneous  Aortic root dimension is at upper limit of normal.  E/E' average = 7.1.     M-mode / 2D Measurements & Calculations:      LVIDd:5.24 cm(3.7 - 5.6 cm)      Diastolic MUHUAX:642 ml   ZCYQZ:7.61 cm(2.2 - 4.0 cm)      Systolic JHZORN:64.5 ml   IVSd:0.87 cm(0.6 - 1.1 cm)       Aortic Root:3.9 cm(2.0 - 3.7 cm)   LVPWd:1.07 cm(0.6 - 1.1 cm)      LA Dimension: 3.9 cm(1.9 - 4.0 cm)   Fractional Shortenin.55 %    LA volume/Index: 136 ml /70m^2   Calculated LVEF (%): 76.81 %     LVOT:2.2 cm      Mitral:                                Aortic      Peak E-Wave: 0.86 m/s                  Peak Velocity: 1.43 m/s   Peak A-Wave: 0.22 m/s                  Mean Velocity: 0.85 m/s   E/A Ratio: 3.87                        Peak Gradient: 8.18 mmHg   Peak Gradient: 2.98 mmHg               Mean Gradient: 3 mmHg   Deceleration Time: 229 msec                                             Area (continuity): 2.22 cm^2                                          AV VTI: 25.7 cm      Tricuspid:                             Pulmonic:      Estimated RVSP: 15 mmHg   Peak TR Velocity: 1.78 m/s   Peak TR Gradient: 12.6736 mmHg   Estimated RA Pressure: 3 mmHg                                          Estimated PASP: 15.67 mmHg     Septal Wall E' velocity:0.10 m/s  Lateral Wall E' velocity:0.15 m/s       ASSESSMENT:       Diagnosis Orders   1. 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 Tubular adenoma of colon          1. Gastric MALT lymphoma clinical stage I  2. Multiple comorbidity  3. Remote history of bladder cancer prostate cancer and recently angiosarcoma of the right cheek surgery done at [address] [place] [address],[address] with no evidence of recurrence  4. Patient does not have congestive heart failure and ejection fraction 59% but diastolic dysfunction    PLAN:     1. Reviewed lab work, imaging studies, outside records and discuss possible diagnosis and treatment recommendations  2.  Patient had stage I gastric MALT lymphoma based on the imaging with no lymphadenopathy, explained to the patient that this is indolent lymphoma and the standard of care would be observation/surveillance(for stage II or above) unless had symptoms or indication for treatment but with stage I there is a option of cure with ISRT(local radiation) or rituximab if ISRT is contraindicated, patient met with radiation oncology and did decide not to proceed with radiation treatment,  3. Patient would like to proceed with the Rituxan which given 1 week over 4 times  4. NCCN guideline was discussed with the patient and his wife  5. 2D echo reviewed no congestive heart failure but diastolic dysfunction  6. Patient will be seen after first treatment  7. With history of similar cancer may be eligible for genetic study  8. Hepatitis B panel as he is getting Rituxan           I spent a total of 40 minutes on the date of the service which included preparing to see the patient, face-to-face patient care, completing clinical documentation, obtaining and/or reviewing separately obtained history, performing a medically appropriate examination, counseling and educating the patient/family/caregiver and ordering medications, tests, or procedures. Patient's conditions were taken into consideration when deciding risk-benefit for therapy. Patient is at high risk for drug interaction risk of complications. Given multiple comorbid conditions patient is at high risk for complication from intervention, risk of hospitalization, medical interaction overall life expectancy. NCCN guidelines were reviewed and discussed with the patient. The diagnosis and care plan were discussed with the patient in detail. I discussed the natural history of the disease, prognosis, risks and goals of therapy and answered all the patients questions to the best of my ability. Patient expressed understanding and was in agreement.       Thank you for the consult we will follow the patient with you                                                Jeffery Nicole [facility]                            This note is created with the assistance of a speech recognition program.  While intending to generate a document that actually reflects the content of the visit, the document can still have some errors including those of syntax and sound a like substitutions which may escape proof reading. It such instances, actual meaning can be extrapolated by contextual diversion.

## 2022-06-23 ENCOUNTER — HOSPITAL ENCOUNTER (OUTPATIENT)
Age: 87
Setting detail: SPECIMEN
Discharge: HOME | End: 2022-06-23

## 2022-06-23 ENCOUNTER — TELEPHONE (OUTPATIENT)
Dept: ONCOLOGY | Age: 87
End: 2022-06-23

## 2022-06-23 DIAGNOSIS — C88.4 EXTRANODAL MARGINAL ZONE LYMPHOMA OF MUCOSA-ASSOCIATED LYMPHOID TISSUE (MALT) OF STOMACH (HCC): ICD-10-CM

## 2022-06-23 DIAGNOSIS — D12.6 TUBULAR ADENOMA OF COLON: Primary | ICD-10-CM

## 2022-06-23 LAB
ABSOLUTE EOS #: 0.22 K/UL (ref 0–0.44)
ABSOLUTE IMMATURE GRANULOCYTE: 0.05 K/UL (ref 0–0.3)
ABSOLUTE LYMPH #: 1.31 K/UL (ref 1.1–3.7)
ABSOLUTE MONO #: 0.96 K/UL (ref 0.1–1.2)
ALBUMIN SERPL-MCNC: 4.1 G/DL (ref 3.5–5.2)
ALBUMIN/GLOBULIN RATIO: 1.8 (ref 1–2.5)
ALP BLD-CCNC: 135 U/L (ref 40–129)
ALT SERPL-CCNC: 11 U/L (ref 5–41)
ANION GAP SERPL CALCULATED.3IONS-SCNC: 17 MMOL/L (ref 9–17)
AST SERPL-CCNC: 26 U/L
BASOPHILS # BLD: 1 % (ref 0–2)
BASOPHILS ABSOLUTE: 0.06 K/UL (ref 0–0.2)
BILIRUB SERPL-MCNC: 0.95 MG/DL (ref 0.3–1.2)
BUN BLDV-MCNC: 29 MG/DL (ref 8–23)
CALCIUM SERPL-MCNC: 9.3 MG/DL (ref 8.6–10.4)
CHLORIDE BLD-SCNC: 100 MMOL/L (ref 98–107)
CO2: 23 MMOL/L (ref 20–31)
CREAT SERPL-MCNC: 1.68 MG/DL (ref 0.7–1.2)
EOSINOPHILS RELATIVE PERCENT: 3 % (ref 1–4)
GFR AFRICAN AMERICAN: 47 ML/MIN
GFR NON-AFRICAN AMERICAN: 39 ML/MIN
GFR SERPL CREATININE-BSD FRML MDRD: ABNORMAL ML/MIN/{1.73_M2}
GLUCOSE BLD-MCNC: 80 MG/DL (ref 70–99)
HBV SURFACE AB TITR SER: <3.5 MIU/ML
HCT VFR BLD CALC: 41.8 % (ref 40.7–50.3)
HEMOGLOBIN: 14 G/DL (ref 13–17)
HEPATITIS B CORE TOTAL ANTIBODY: NONREACTIVE
HEPATITIS B SURFACE ANTIGEN: NONREACTIVE
IMMATURE GRANULOCYTES: 1 %
LYMPHOCYTES # BLD: 15 % (ref 24–43)
MCH RBC QN AUTO: 32.2 PG (ref 25.2–33.5)
MCHC RBC AUTO-ENTMCNC: 33.5 G/DL (ref 28.4–34.8)
MCV RBC AUTO: 96.1 FL (ref 82.6–102.9)
MONOCYTES # BLD: 11 % (ref 3–12)
NRBC AUTOMATED: 0 PER 100 WBC
PDW BLD-RTO: 13.6 % (ref 11.8–14.4)
PLATELET # BLD: 202 K/UL (ref 138–453)
PMV BLD AUTO: 11 FL (ref 8.1–13.5)
POTASSIUM SERPL-SCNC: 4 MMOL/L (ref 3.7–5.3)
RBC # BLD: 4.35 M/UL (ref 4.21–5.77)
SEG NEUTROPHILS: 69 % (ref 36–65)
SEGMENTED NEUTROPHILS ABSOLUTE COUNT: 6.14 K/UL (ref 1.5–8.1)
SODIUM BLD-SCNC: 140 MMOL/L (ref 135–144)
TOTAL PROTEIN: 6.4 G/DL (ref 6.4–8.3)
WBC # BLD: 8.7 K/UL (ref 3.5–11.3)

## 2022-06-23 NOTE — TELEPHONE ENCOUNTER
Name: Dayami Glover  : 9/3/1933  MRN: [id number]    Oncology Navigation Follow-Up Note    Contact Type:  Telephone  Notes: Dr. Neelima Romero alerted writer stool for h-pylori order placed & requested writer contact pt to update. Spoke with Ya Caruso, pt's spouse, updated on new order, requested p/u container from any [place] [address] lab, obtain specimen & return to [place] [address] lab. Ya Caruso verbalized understanding & denied questions/concerns. Will continue to follow.     Electronically signed by Sebastian Hughes RN on 2022 at 12:49 PM

## 2022-06-24 ENCOUNTER — HOSPITAL ENCOUNTER (OUTPATIENT)
Age: 87
Setting detail: SPECIMEN
Discharge: HOME | End: 2022-06-24

## 2022-06-24 DIAGNOSIS — D12.6 TUBULAR ADENOMA OF COLON: ICD-10-CM

## 2022-06-25 LAB
DIRECT EXAM: NEGATIVE
SPECIMEN DESCRIPTION: NORMAL

## 2022-06-27 ENCOUNTER — TELEPHONE (OUTPATIENT)
Dept: INFUSION THERAPY | Facility: MEDICAL CENTER | Age: 87
End: 2022-06-27

## 2022-06-27 DIAGNOSIS — C78.7 LIVER METASTASES (HCC): Primary | ICD-10-CM

## 2022-06-27 NOTE — TELEPHONE ENCOUNTER
New Chemotherapy Orders  - Dr. Lin [place]    4/12/22 Surgical Pathology Extraneal marginal zone B - cell lymphoma of mucosa associated lymphoid tissue. (KYARA lymphoma)    Ruxience - 28 day cycle    Ht - 170.2 cm    Wt - 83 kg    BSA - 1.98    Ruxience 375 mg/m2 = 742.5 mg - rounded to 740 mg IV Day 3,0,63,14    Kardex updated and labs to epivc;chart to  for scheduling, note to pool for 2nd RN check.

## 2022-06-28 ENCOUNTER — HOSPITAL ENCOUNTER (OUTPATIENT)
Facility: MEDICAL CENTER | Age: 87
End: 2022-06-28
Payer: COMMERCIAL

## 2022-06-28 NOTE — TELEPHONE ENCOUNTER
AGREE WITH CALCULATIONS AFTER REVIEWING ORDERS, RITUXAN X4 D 1, D8, D15, D22,   743 MG ROUNDED PER Epic  MG IV.

## 2022-06-29 ENCOUNTER — HOSPITAL ENCOUNTER (OUTPATIENT)
Dept: INFUSION THERAPY | Facility: MEDICAL CENTER | Age: 87
Discharge: HOME | End: 2022-06-29
Payer: COMMERCIAL

## 2022-06-29 ENCOUNTER — TELEPHONE (OUTPATIENT)
Dept: ONCOLOGY | Age: 87
End: 2022-06-29

## 2022-06-29 VITALS
HEART RATE: 51 BPM | DIASTOLIC BLOOD PRESSURE: 70 MMHG | SYSTOLIC BLOOD PRESSURE: 119 MMHG | RESPIRATION RATE: 16 BRPM | TEMPERATURE: 97.4 F

## 2022-06-29 DIAGNOSIS — C88.4 EXTRANODAL MARGINAL ZONE LYMPHOMA OF MUCOSA-ASSOCIATED LYMPHOID TISSUE (MALT) OF STOMACH (HCC): ICD-10-CM

## 2022-06-29 DIAGNOSIS — C78.7 LIVER METASTASES (HCC): Primary | ICD-10-CM

## 2022-06-29 LAB
ABSOLUTE EOS #: 0.29 K/UL (ref 0–0.44)
ABSOLUTE IMMATURE GRANULOCYTE: 0.07 K/UL (ref 0–0.3)
ABSOLUTE LYMPH #: 1.33 K/UL (ref 1.1–3.7)
ABSOLUTE MONO #: 0.92 K/UL (ref 0.1–1.2)
ALBUMIN SERPL-MCNC: 4.1 G/DL (ref 3.5–5.2)
ALP BLD-CCNC: 132 U/L (ref 40–129)
ALT SERPL-CCNC: 13 U/L (ref 5–41)
ANION GAP SERPL CALCULATED.3IONS-SCNC: 9 MMOL/L (ref 9–17)
AST SERPL-CCNC: 19 U/L
BASOPHILS # BLD: 1 % (ref 0–2)
BASOPHILS ABSOLUTE: 0.05 K/UL (ref 0–0.2)
BILIRUB SERPL-MCNC: 0.83 MG/DL (ref 0.3–1.2)
BUN BLDV-MCNC: 29 MG/DL (ref 8–23)
BUN/CREAT BLD: 19 (ref 9–20)
CALCIUM SERPL-MCNC: 9.2 MG/DL (ref 8.6–10.4)
CHLORIDE BLD-SCNC: 105 MMOL/L (ref 98–107)
CO2: 26 MMOL/L (ref 20–31)
CREAT SERPL-MCNC: 1.51 MG/DL (ref 0.7–1.2)
EOSINOPHILS RELATIVE PERCENT: 3 % (ref 1–4)
GFR AFRICAN AMERICAN: 53 ML/MIN
GFR NON-AFRICAN AMERICAN: 44 ML/MIN
GFR SERPL CREATININE-BSD FRML MDRD: ABNORMAL ML/MIN/{1.73_M2}
GLUCOSE BLD-MCNC: 96 MG/DL (ref 70–99)
HCT VFR BLD CALC: 42.2 % (ref 40.7–50.3)
HEMOGLOBIN: 13.6 G/DL (ref 13–17)
IMMATURE GRANULOCYTES: 1 %
LYMPHOCYTES # BLD: 14 % (ref 24–43)
MCH RBC QN AUTO: 31.7 PG (ref 25.2–33.5)
MCHC RBC AUTO-ENTMCNC: 32.2 G/DL (ref 28.4–34.8)
MCV RBC AUTO: 98.4 FL (ref 82.6–102.9)
MONOCYTES # BLD: 10 % (ref 3–12)
NRBC AUTOMATED: 0 PER 100 WBC
PDW BLD-RTO: 13.7 % (ref 11.8–14.4)
PLATELET # BLD: 177 K/UL (ref 138–453)
PMV BLD AUTO: 10.1 FL (ref 8.1–13.5)
POTASSIUM SERPL-SCNC: 3.8 MMOL/L (ref 3.7–5.3)
RBC # BLD: 4.29 M/UL (ref 4.21–5.77)
SEG NEUTROPHILS: 71 % (ref 36–65)
SEGMENTED NEUTROPHILS ABSOLUTE COUNT: 6.69 K/UL (ref 1.5–8.1)
SODIUM BLD-SCNC: 140 MMOL/L (ref 135–144)
TOTAL PROTEIN: 6.4 G/DL (ref 6.4–8.3)
WBC # BLD: 9.4 K/UL (ref 3.5–11.3)

## 2022-06-29 PROCEDURE — 96415 CHEMO IV INFUSION ADDL HR: CPT

## 2022-06-29 PROCEDURE — 96413 CHEMO IV INFUSION 1 HR: CPT

## 2022-06-29 PROCEDURE — 85025 COMPLETE CBC W/AUTO DIFF WBC: CPT

## 2022-06-29 PROCEDURE — 2580000003 HC RX 258: Performed by: INTERNAL MEDICINE

## 2022-06-29 PROCEDURE — 6360000002 HC RX W HCPCS: Performed by: INTERNAL MEDICINE

## 2022-06-29 PROCEDURE — 6370000000 HC RX 637 (ALT 250 FOR IP): Performed by: INTERNAL MEDICINE

## 2022-06-29 PROCEDURE — 96375 TX/PRO/DX INJ NEW DRUG ADDON: CPT

## 2022-06-29 PROCEDURE — 80053 COMPREHEN METABOLIC PANEL: CPT

## 2022-06-29 RX ORDER — SODIUM CHLORIDE 0.9 % (FLUSH) 0.9 %
5-40 SYRINGE (ML) INJECTION PRN
Status: DISCONTINUED | OUTPATIENT
Start: 2022-06-29 | End: 2022-06-30 | Stop reason: HOSPADM

## 2022-06-29 RX ORDER — SODIUM CHLORIDE 9 MG/ML
5-250 INJECTION, SOLUTION INTRAVENOUS PRN
Status: DISCONTINUED | OUTPATIENT
Start: 2022-06-29 | End: 2022-06-30 | Stop reason: HOSPADM

## 2022-06-29 RX ORDER — DIPHENHYDRAMINE HYDROCHLORIDE 50 MG/ML
25 INJECTION INTRAMUSCULAR; INTRAVENOUS ONCE
Status: COMPLETED | OUTPATIENT
Start: 2022-06-29 | End: 2022-06-29

## 2022-06-29 RX ORDER — IBUPROFEN 200 MG
200 TABLET ORAL ONCE
Status: COMPLETED | OUTPATIENT
Start: 2022-06-29 | End: 2022-06-29

## 2022-06-29 RX ORDER — ONDANSETRON 2 MG/ML
8 INJECTION INTRAMUSCULAR; INTRAVENOUS
Status: DISCONTINUED | OUTPATIENT
Start: 2022-06-29 | End: 2022-06-29 | Stop reason: HOSPADM

## 2022-06-29 RX ORDER — ACETAMINOPHEN 325 MG/1
650 TABLET ORAL ONCE
Status: COMPLETED | OUTPATIENT
Start: 2022-06-29 | End: 2022-06-29

## 2022-06-29 RX ADMIN — SODIUM CHLORIDE 700 MG: 9 INJECTION, SOLUTION INTRAVENOUS at 09:53

## 2022-06-29 RX ADMIN — IBUPROFEN 200 MG: 200 TABLET, FILM COATED ORAL at 13:00

## 2022-06-29 RX ADMIN — ACETAMINOPHEN 650 MG: 325 TABLET ORAL at 09:15

## 2022-06-29 RX ADMIN — DIPHENHYDRAMINE HYDROCHLORIDE 25 MG: 50 INJECTION, SOLUTION INTRAMUSCULAR; INTRAVENOUS at 09:15

## 2022-06-29 RX ADMIN — SODIUM CHLORIDE 250 ML/HR: 9 INJECTION, SOLUTION INTRAVENOUS at 08:30

## 2022-06-29 ASSESSMENT — PAIN SCALES - GENERAL
PAINLEVEL_OUTOF10: 0
PAINLEVEL_OUTOF10: 5

## 2022-06-29 NOTE — PROGRESS NOTES
Patient arrived ambulatory with wife for cycle 1 day 1 Ruxience infusion. Patient denies complaints or concerns. Lab at bedside to draw blood. IV inserted per unit policy. Chemotherapy teaching  completed by Bryson Seip. Patient and wife comfortable to proceed with treatment. Labs reviewed. Patient premedicated. Ruxience initiated. 1156 Patient complains of slight headache. Rates a 3. Vitals stable. Informed Pharmacist Jose Ladd. Will continue to watch.  1225 patient states headache increased to rating of 5. Ruxience paused. Vitals stable. Spoke with Pharmacist Jose Ladd. She states headaches can be side affect of Ruxience but is not a sign of reaction. Ruxience resumed. Patient requests something for pain. Physian called and informed of headache. Orders received. Ibuprofen 200 mg given PO as ordered. 1343  Patient states headache much improved to 1. Ruxience completed with no sign adverse reaction;line flushed. See MAR for rate changes and Epic for stable vitals. Patient observed for 30 minutes post infusion with running IV fluids. No sign adverse reaction. IV removed and pressure dressing applied. Patient ambulated off unit with wife at discharge. Printed calendar in hand.

## 2022-06-29 NOTE — PROGRESS NOTES
Christiano Hicks and his wife here for chemotherapy teaching. Spent 30 minutes with them     Teaching sheets from [facility] and verbal information given on chemotherapy agents, action, administration and side effects. Provided books chemotherapy and you and Eating Hints: Before During, and After Cancer treatment. Handout about unit with phone numbers for [facility] hematology/oncology associates provided.     Drugs discussed: Rituxan     Reviewed support services, dietician, , pastoral care, etc   Assesment forms filled out by family     Questions answered, support given

## 2022-07-06 ENCOUNTER — TELEPHONE (OUTPATIENT)
Dept: ONCOLOGY | Age: 87
End: 2022-07-06

## 2022-07-06 ENCOUNTER — HOSPITAL ENCOUNTER (OUTPATIENT)
Dept: INFUSION THERAPY | Facility: MEDICAL CENTER | Age: 87
Discharge: HOME | End: 2022-07-06
Payer: COMMERCIAL

## 2022-07-06 ENCOUNTER — HOSPITAL ENCOUNTER (OUTPATIENT)
Facility: MEDICAL CENTER | Age: 87
Discharge: HOME | End: 2022-07-06
Payer: COMMERCIAL

## 2022-07-06 VITALS
RESPIRATION RATE: 16 BRPM | SYSTOLIC BLOOD PRESSURE: 105 MMHG | DIASTOLIC BLOOD PRESSURE: 62 MMHG | TEMPERATURE: 97.7 F | HEART RATE: 49 BPM

## 2022-07-06 DIAGNOSIS — C88.4 EXTRANODAL MARGINAL ZONE LYMPHOMA OF MUCOSA-ASSOCIATED LYMPHOID TISSUE (MALT) OF STOMACH (HCC): Primary | ICD-10-CM

## 2022-07-06 DIAGNOSIS — C78.7 LIVER METASTASES (HCC): ICD-10-CM

## 2022-07-06 LAB
ABSOLUTE EOS #: 0.21 K/UL (ref 0–0.44)
ABSOLUTE IMMATURE GRANULOCYTE: 0.07 K/UL (ref 0–0.3)
ABSOLUTE LYMPH #: 0.98 K/UL (ref 1.1–3.7)
ABSOLUTE MONO #: 0.94 K/UL (ref 0.1–1.2)
ALBUMIN SERPL-MCNC: 4.2 G/DL (ref 3.5–5.2)
ALP BLD-CCNC: 137 U/L (ref 40–129)
ALT SERPL-CCNC: 17 U/L (ref 5–41)
ANION GAP SERPL CALCULATED.3IONS-SCNC: 10 MMOL/L (ref 9–17)
AST SERPL-CCNC: 26 U/L
BASOPHILS # BLD: 1 % (ref 0–2)
BASOPHILS ABSOLUTE: 0.06 K/UL (ref 0–0.2)
BILIRUB SERPL-MCNC: 0.82 MG/DL (ref 0.3–1.2)
BUN BLDV-MCNC: 23 MG/DL (ref 8–23)
BUN/CREAT BLD: 16 (ref 9–20)
CALCIUM SERPL-MCNC: 9.3 MG/DL (ref 8.6–10.4)
CHLORIDE BLD-SCNC: 102 MMOL/L (ref 98–107)
CO2: 27 MMOL/L (ref 20–31)
CREAT SERPL-MCNC: 1.4 MG/DL (ref 0.7–1.2)
EOSINOPHILS RELATIVE PERCENT: 3 % (ref 1–4)
GFR AFRICAN AMERICAN: 58 ML/MIN
GFR NON-AFRICAN AMERICAN: 48 ML/MIN
GFR SERPL CREATININE-BSD FRML MDRD: ABNORMAL ML/MIN/{1.73_M2}
GLUCOSE BLD-MCNC: 108 MG/DL (ref 70–99)
HCT VFR BLD CALC: 42.3 % (ref 40.7–50.3)
HEMOGLOBIN: 13.8 G/DL (ref 13–17)
IMMATURE GRANULOCYTES: 1 %
LYMPHOCYTES # BLD: 12 % (ref 24–43)
MCH RBC QN AUTO: 31.4 PG (ref 25.2–33.5)
MCHC RBC AUTO-ENTMCNC: 32.6 G/DL (ref 28.4–34.8)
MCV RBC AUTO: 96.4 FL (ref 82.6–102.9)
MONOCYTES # BLD: 11 % (ref 3–12)
NRBC AUTOMATED: 0 PER 100 WBC
PDW BLD-RTO: 13.6 % (ref 11.8–14.4)
PLATELET # BLD: 208 K/UL (ref 138–453)
PMV BLD AUTO: 10.2 FL (ref 8.1–13.5)
POTASSIUM SERPL-SCNC: 4 MMOL/L (ref 3.7–5.3)
RBC # BLD: 4.39 M/UL (ref 4.21–5.77)
SEG NEUTROPHILS: 72 % (ref 36–65)
SEGMENTED NEUTROPHILS ABSOLUTE COUNT: 6.22 K/UL (ref 1.5–8.1)
SODIUM BLD-SCNC: 139 MMOL/L (ref 135–144)
TOTAL PROTEIN: 6.4 G/DL (ref 6.4–8.3)
WBC # BLD: 8.5 K/UL (ref 3.5–11.3)

## 2022-07-06 PROCEDURE — 6370000000 HC RX 637 (ALT 250 FOR IP): Performed by: INTERNAL MEDICINE

## 2022-07-06 PROCEDURE — 2580000003 HC RX 258: Performed by: INTERNAL MEDICINE

## 2022-07-06 PROCEDURE — 85025 COMPLETE CBC W/AUTO DIFF WBC: CPT

## 2022-07-06 PROCEDURE — 96415 CHEMO IV INFUSION ADDL HR: CPT

## 2022-07-06 PROCEDURE — 36415 COLL VENOUS BLD VENIPUNCTURE: CPT

## 2022-07-06 PROCEDURE — 96413 CHEMO IV INFUSION 1 HR: CPT

## 2022-07-06 PROCEDURE — 80053 COMPREHEN METABOLIC PANEL: CPT

## 2022-07-06 PROCEDURE — 96375 TX/PRO/DX INJ NEW DRUG ADDON: CPT

## 2022-07-06 PROCEDURE — 6360000002 HC RX W HCPCS: Performed by: INTERNAL MEDICINE

## 2022-07-06 RX ORDER — DIPHENHYDRAMINE HYDROCHLORIDE 50 MG/ML
25 INJECTION INTRAMUSCULAR; INTRAVENOUS ONCE
Status: COMPLETED | OUTPATIENT
Start: 2022-07-06 | End: 2022-07-06

## 2022-07-06 RX ORDER — ACETAMINOPHEN 325 MG/1
650 TABLET ORAL ONCE
Status: COMPLETED | OUTPATIENT
Start: 2022-07-06 | End: 2022-07-06

## 2022-07-06 RX ORDER — SODIUM CHLORIDE 9 MG/ML
5-250 INJECTION, SOLUTION INTRAVENOUS PRN
Status: DISCONTINUED | OUTPATIENT
Start: 2022-07-06 | End: 2022-07-07 | Stop reason: HOSPADM

## 2022-07-06 RX ADMIN — ACETAMINOPHEN 650 MG: 325 TABLET ORAL at 09:35

## 2022-07-06 RX ADMIN — SODIUM CHLORIDE 20 ML/HR: 9 INJECTION, SOLUTION INTRAVENOUS at 09:36

## 2022-07-06 RX ADMIN — DIPHENHYDRAMINE HYDROCHLORIDE 25 MG: 50 INJECTION, SOLUTION INTRAMUSCULAR; INTRAVENOUS at 09:35

## 2022-07-06 RX ADMIN — SODIUM CHLORIDE 700 MG: 9 INJECTION, SOLUTION INTRAVENOUS at 10:09

## 2022-07-06 NOTE — PROGRESS NOTES
Patient arrive ambulatory using cane with spouse for cycle 1 day 8 treatment. Denies complaint or concern. Denies issue with previous treatment with exception of headache; uncertain if related. Vitals as charted. Peripheral IV established per policy. Patient premedicated. Ruxience infused with no sign of adverse reaction; line flushed. See titration per [facility] and vitals per epic flow sheet. Patient monitored post infusion; no adverse reaction. Intact IV catheter removed with pressure dressing applied. Patient ambulate off unit using cane with spouse at discharge; has return appointment.

## 2022-07-06 NOTE — FLOWSHEET NOTE
Writer encountered and visited briefly with Patient and Spouse in the infusion clinic while rounding with lunch trays. Patient had difficulty hearing writer. Wife repeated writer's words which Pt seemed to understand. Patient and Spouse selected lunch trays, which writer gave them. Pt and Spouse thanked writer. Later, Pt told writer, \"It was good. \"    Deysi Armenta will attempt to visit with Patient and Spouse at another time.        07/06/22 1249   Encounter Summary   Encounter Overview/Reason  Initial Encounter   Service Provided For: Patient and family together   Referral/Consult From: Rounding   Support System Spouse   Last Encounter  07/06/22   Complexity of Encounter Low   Begin Time 1200   End Time  1205   Total Time Calculated 5 min   Encounter    Type Initial Screen/Assessment   Spiritual/Emotional needs   Type Other (comment)  (Distributing lunch trays)   Assessment/Intervention/Outcome   Assessment Calm   Intervention Sustaining Presence/Ministry of presence   Outcome Expressed Gratitude   Plan and Referrals   Plan/Referrals Continue Support (comment)     Electronically signed by Waleska Horne, Oncology Outpatient [facility], [address] [facility] Radiation Oncology  7/6/2022  12:53 PM

## 2022-07-13 ENCOUNTER — OFFICE VISIT (OUTPATIENT)
Dept: ONCOLOGY | Age: 87
End: 2022-07-13
Payer: COMMERCIAL

## 2022-07-13 ENCOUNTER — HOSPITAL ENCOUNTER (OUTPATIENT)
Facility: MEDICAL CENTER | Age: 87
Discharge: HOME | End: 2022-07-13
Payer: COMMERCIAL

## 2022-07-13 ENCOUNTER — TELEPHONE (OUTPATIENT)
Dept: ONCOLOGY | Age: 87
End: 2022-07-13

## 2022-07-13 ENCOUNTER — HOSPITAL ENCOUNTER (OUTPATIENT)
Dept: INFUSION THERAPY | Facility: MEDICAL CENTER | Age: 87
Discharge: HOME | End: 2022-07-13
Payer: COMMERCIAL

## 2022-07-13 VITALS — BODY MASS INDEX: 28.07 KG/M2 | RESPIRATION RATE: 16 BRPM | TEMPERATURE: 97.7 F | WEIGHT: 179.2 LBS

## 2022-07-13 VITALS — DIASTOLIC BLOOD PRESSURE: 62 MMHG | HEART RATE: 51 BPM | SYSTOLIC BLOOD PRESSURE: 111 MMHG

## 2022-07-13 DIAGNOSIS — Z85.820 HISTORY OF MELANOMA: ICD-10-CM

## 2022-07-13 DIAGNOSIS — C78.7 LIVER METASTASES (HCC): ICD-10-CM

## 2022-07-13 DIAGNOSIS — C88.4 EXTRANODAL MARGINAL ZONE LYMPHOMA OF MUCOSA-ASSOCIATED LYMPHOID TISSUE (MALT) OF STOMACH (HCC): Primary | ICD-10-CM

## 2022-07-13 DIAGNOSIS — Z85.51 HISTORY OF BLADDER CANCER: ICD-10-CM

## 2022-07-13 DIAGNOSIS — Z85.46 HISTORY OF PROSTATE CANCER: ICD-10-CM

## 2022-07-13 DIAGNOSIS — Z86.010 HISTORY OF COLON POLYPS: ICD-10-CM

## 2022-07-13 LAB
ABSOLUTE EOS #: 0.26 K/UL (ref 0–0.44)
ABSOLUTE IMMATURE GRANULOCYTE: 0.07 K/UL (ref 0–0.3)
ABSOLUTE LYMPH #: 1.15 K/UL (ref 1.1–3.7)
ABSOLUTE MONO #: 0.7 K/UL (ref 0.1–1.2)
ALBUMIN SERPL-MCNC: 4 G/DL (ref 3.5–5.2)
ALP BLD-CCNC: 139 U/L (ref 40–129)
ALT SERPL-CCNC: 15 U/L (ref 5–41)
ANION GAP SERPL CALCULATED.3IONS-SCNC: 11 MMOL/L (ref 9–17)
AST SERPL-CCNC: 23 U/L
BASOPHILS # BLD: 1 % (ref 0–2)
BASOPHILS ABSOLUTE: 0.06 K/UL (ref 0–0.2)
BILIRUB SERPL-MCNC: 1.23 MG/DL (ref 0.3–1.2)
BUN BLDV-MCNC: 23 MG/DL (ref 8–23)
BUN/CREAT BLD: 16 (ref 9–20)
CALCIUM SERPL-MCNC: 9 MG/DL (ref 8.6–10.4)
CHLORIDE BLD-SCNC: 102 MMOL/L (ref 98–107)
CO2: 25 MMOL/L (ref 20–31)
CREAT SERPL-MCNC: 1.43 MG/DL (ref 0.7–1.2)
EOSINOPHILS RELATIVE PERCENT: 3 % (ref 1–4)
GFR AFRICAN AMERICAN: 57 ML/MIN
GFR NON-AFRICAN AMERICAN: 47 ML/MIN
GFR SERPL CREATININE-BSD FRML MDRD: ABNORMAL ML/MIN/{1.73_M2}
GLUCOSE BLD-MCNC: 134 MG/DL (ref 70–99)
HCT VFR BLD CALC: 42.3 % (ref 40.7–50.3)
HEMOGLOBIN: 13.9 G/DL (ref 13–17)
IMMATURE GRANULOCYTES: 1 %
LYMPHOCYTES # BLD: 14 % (ref 24–43)
MCH RBC QN AUTO: 31.7 PG (ref 25.2–33.5)
MCHC RBC AUTO-ENTMCNC: 32.9 G/DL (ref 28.4–34.8)
MCV RBC AUTO: 96.4 FL (ref 82.6–102.9)
MONOCYTES # BLD: 8 % (ref 3–12)
NRBC AUTOMATED: 0 PER 100 WBC
PDW BLD-RTO: 13.5 % (ref 11.8–14.4)
PLATELET # BLD: 209 K/UL (ref 138–453)
PMV BLD AUTO: 10.3 FL (ref 8.1–13.5)
POTASSIUM SERPL-SCNC: 3.8 MMOL/L (ref 3.7–5.3)
RBC # BLD: 4.39 M/UL (ref 4.21–5.77)
SEG NEUTROPHILS: 73 % (ref 36–65)
SEGMENTED NEUTROPHILS ABSOLUTE COUNT: 6.15 K/UL (ref 1.5–8.1)
SODIUM BLD-SCNC: 138 MMOL/L (ref 135–144)
TOTAL PROTEIN: 6.3 G/DL (ref 6.4–8.3)
WBC # BLD: 8.4 K/UL (ref 3.5–11.3)

## 2022-07-13 PROCEDURE — 99214 OFFICE O/P EST MOD 30 MIN: CPT | Performed by: INTERNAL MEDICINE

## 2022-07-13 PROCEDURE — 96415 CHEMO IV INFUSION ADDL HR: CPT

## 2022-07-13 PROCEDURE — 1123F ACP DISCUSS/DSCN MKR DOCD: CPT | Performed by: INTERNAL MEDICINE

## 2022-07-13 PROCEDURE — 80053 COMPREHEN METABOLIC PANEL: CPT

## 2022-07-13 PROCEDURE — 36415 COLL VENOUS BLD VENIPUNCTURE: CPT

## 2022-07-13 PROCEDURE — 96413 CHEMO IV INFUSION 1 HR: CPT

## 2022-07-13 PROCEDURE — 6360000002 HC RX W HCPCS: Performed by: INTERNAL MEDICINE

## 2022-07-13 PROCEDURE — 6370000000 HC RX 637 (ALT 250 FOR IP): Performed by: INTERNAL MEDICINE

## 2022-07-13 PROCEDURE — 2580000003 HC RX 258: Performed by: INTERNAL MEDICINE

## 2022-07-13 PROCEDURE — 85025 COMPLETE CBC W/AUTO DIFF WBC: CPT

## 2022-07-13 PROCEDURE — 96375 TX/PRO/DX INJ NEW DRUG ADDON: CPT

## 2022-07-13 RX ORDER — DIPHENHYDRAMINE HYDROCHLORIDE 50 MG/ML
25 INJECTION INTRAMUSCULAR; INTRAVENOUS ONCE
Status: COMPLETED | OUTPATIENT
Start: 2022-07-13 | End: 2022-07-13

## 2022-07-13 RX ORDER — ACETAMINOPHEN 325 MG/1
650 TABLET ORAL ONCE
Status: COMPLETED | OUTPATIENT
Start: 2022-07-13 | End: 2022-07-13

## 2022-07-13 RX ADMIN — SODIUM CHLORIDE 700 MG: 9 INJECTION, SOLUTION INTRAVENOUS at 10:22

## 2022-07-13 RX ADMIN — ACETAMINOPHEN 650 MG: 325 TABLET ORAL at 09:42

## 2022-07-13 RX ADMIN — DIPHENHYDRAMINE HYDROCHLORIDE 25 MG: 50 INJECTION, SOLUTION INTRAMUSCULAR; INTRAVENOUS at 09:42

## 2022-07-13 ASSESSMENT — PAIN SCALES - GENERAL: PAINLEVEL_OUTOF10: 0

## 2022-07-13 NOTE — PROGRESS NOTES
Patient ID: Kanwal Burton, 9/3/1933, [id number], 80 y.o.   Referred by :  Saanm Marcano MD    Reason for consultation: Gastric marginal zone lymphoma      HISTORY OF PRESENT ILLNESS:    Oncologic History:    Kanwal Burton is a very pleasant 80 y.o. 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 [facility] with no radiation  Imaging was done including CT abdomen pelvis and MRI and no evidence of metastasis/cancer/lymphadenopathy  PET/CT and no evidence of lymphoma activity elsewhere other than stomach  There was diffuse low-level activity throughout the stomach and the small bowel without CT abnormality  Case discussed at the tumor board  Patient visited with radiation oncology and he decided not to get radiation and decided to start Rituxan weekly for 4 weeks which started on 6/29/2022        Interim history  Patient tolerated treatment well other than fatigue related to Rituxan  Creatinine at 1.43 and total bilirubin 1.23      Past Medical History:   Diagnosis Date    Bilateral edema of lower extremity 1995    Bilateral hearing loss     wears hearing aids    Diverticulosis of sigmoid colon     Gout     History of bladder cancer 1980    Dr. [facility] yearly cysto    History of colon polyps 09/10/2012    History of hypokalemia     History of melanoma 2008    left cheek    History of prostate cancer 1992    S/P Prostatectomy    Hyperlipidemia     Hypertension     Tubular adenoma of colon 09/10/2012       Past Surgical History:   Procedure Laterality Date    COLONOSCOPY  2005    dr Glenn Augustin  09/10/2012    significant diverticulosis, tubular adenoma-sigmoid colon, small hemorrhoids    COLONOSCOPY  10/03/2016    significant diverticulosis sigmoid colon, small polyp rectum-hyperplastic    EYE SURGERY Bilateral 1994    cataracts removed    HERNIA REPAIR Right 1956    inguinal    HERNIA REPAIR Left 1995    inguinal    JOINT REPLACEMENT Right 1984    great toe joint D/T gout    KNEE ARTHROPLASTY Left 2000    meniscal repair    PROSTATE SURGERY  2002       Allergies   Allergen Reactions    Mirabegron      Elevated blood pressure       Current Outpatient Medications   Medication Sig Dispense Refill    atorvastatin (LIPITOR) 20 MG tablet take 1 tablet by mouth once daily 90 tablet 1    apixaban (ELIQUIS) 5 MG TABS tablet Take 5 mg by mouth 2 times daily      furosemide (LASIX) 20 MG tablet Take 20 mg by mouth daily as needed for Other      pantoprazole (PROTONIX) 40 MG tablet Take 1 tablet by mouth daily 90 tablet 1    magnesium oxide (MAG-OX) 400 MG tablet Take 1 tablet by mouth daily 90 tablet 1    amLODIPine (NORVASC) 5 MG tablet TAKE 1 TABLET BY MOUTH ONCE DAILY 90 tablet 1    latanoprost (XALATAN) 0.005 % ophthalmic solution INSTILL 1 DROP INTO EACH EYE NIGHTLY 3 each 1    gabapentin (NEURONTIN) 100 MG capsule Take 1 capsule at night 90 capsule 1    lisinopril (PRINIVIL;ZESTRIL) 40 MG tablet Take 1 tablet by mouth daily 90 tablet 1    allopurinol (ZYLOPRIM) 300 MG tablet Take 1 tablet by mouth daily 90 tablet 1    timolol (TIMOPTIC) 0.5 % ophthalmic solution Place 1 drop into the left eye daily 3 each 1    olopatadine (PATANASE) 0.6 % SOLN nassl soln SPRAY 2 SPRAY(S) IN EACH NOSTRIL NIGHTLY 3 each 1    hydroCHLOROthiazide (HYDRODIURIL) 25 MG tablet Take 1 tablet by mouth daily 90 tablet 1    ipratropium (ATROVENT) 0.03 % nasal spray 2 sprays by Nasal route 3 times daily 3 each 1    potassium chloride (KLOR-CON M) 20 MEQ extended release tablet Take 1 tablet by mouth daily (Patient not taking: Reported on 5/24/2022) 90 tablet 1    potassium chloride (KLOR-CON M) 10 MEQ extended release tablet Take 1 tablet by mouth 2 times daily 30 tablet 0    tamsulosin (FLOMAX) 0.4 MG capsule Take 1 capsule by mouth daily (Patient not taking: Reported on 7/7/2021) 90 capsule 3     No current facility-administered medications for this visit. Facility-Administered Medications Ordered in Other Visits   Medication Dose Route Frequency Provider Last Rate Last Admin    acetaminophen (TYLENOL) tablet 650 mg  650 mg Oral Once Catalino Elise MD        diphenhydrAMINE (BENADRYL) injection 25 mg  25 mg IntraVENous Once Catalino Elise MD        riTUXimab-pvvr (RUXIENCE) 700 mg in sodium chloride 0.9 % 250 mL chemo IVPB  375 mg/m2 (Treatment Plan Adjusted) IntraVENous Once Catalino Elise MD           Social History     Socioeconomic History    Marital status:      Spouse name: Not on file    Number of children: Not on file    Years of education: Not on file    Highest education level: Not on file   Occupational History    Not on file   Tobacco Use    Smoking status: Never Smoker    Smokeless tobacco: Never Used   Vaping Use    Vaping Use: Never used   Substance and Sexual Activity    Alcohol use:  Yes     Alcohol/week: 10.0 standard drinks     Types: 10 Shots of liquor per week     Comment: 1-2 cocktails per week    Drug use: No    Sexual activity: Not on file   Other Topics Concern    Not on file   Social History Narrative    Not on file     Social Determinants of Health     Financial Resource Strain:     Difficulty of Paying Living Expenses: Not on file   Food Insecurity:  Worried About Running Out of Food in the Last Year: Not on file    Sidra of Food in the Last Year: Not on file   Transportation Needs:     Lack of Transportation (Medical): Not on file    Lack of Transportation (Non-Medical): Not on file   Physical Activity:     Days of Exercise per Week: Not on file    Minutes of Exercise per Session: Not on file   Stress:     Feeling of Stress : Not on file   Social Connections:     Frequency of Communication with Friends and Family: Not on file    Frequency of Social Gatherings with Friends and Family: Not on file    Attends Taoist Services: Not on file    Active Member of [address] [place] [address] [employer] or Organizations: Not on file    Attends Club or Organization Meetings: Not on file    Marital Status: Not on file   Intimate Partner Violence:     Fear of Current or Ex-Partner: Not on file    Emotionally Abused: Not on file    Physically Abused: Not on file    Sexually Abused: Not on file   Housing Stability:     Unable to Pay for Housing in the Last Year: Not on file    Number of [place] in the Last Year: Not on file    Unstable Housing in the Last Year: Not on file       Family History   Problem Relation Age of Onset    Heart Attack Mother     Heart Attack Father         REVIEW OF SYSTEM:     Constitutional: No fever or chills. No night sweats, no weight loss   Eyes: No eye discharge, double vision, or eye pain   HEENT: negative for sore mouth, sore throat, hoarseness and voice change   Respiratory: negative for cough , sputum, dyspnea, wheezing, hemoptysis, chest pain   Cardiovascular: negative for chest pain, dyspnea, palpitations, orthopnea, PND   Gastrointestinal: negative for nausea, vomiting, diarrhea, constipation, abdominal pain, Dysphagia, hematemesis and hematochezia   Genitourinary: negative for frequency, dysuria, nocturia, urinary incontinence, and hematuria   Integument: negative for rash, skin lesions, bruises.    Hematologic/Lymphatic: negative for easy bruising, bleeding, lymphadenopathy, petechiae and swelling/edema   Endocrine: negative for heat or cold intolerance, tremor, weight changes, change in bowel habits and hair loss   Musculoskeletal: negative for myalgias, arthralgias, pain, joint swelling,and bone pain   Neurological: negative for headaches, dizziness, seizures, weakness, numbness       OBJECTIVE:         Vitals:    07/13/22 0850   Resp: 16   Temp: 97.7 °F (36.5 °C)       PHYSICAL EXAM:   General appearance - well appearing, no in pain or distress   Mental status - alert and cooperative   Eyes - pupils equal and reactive, extraocular eye movements intact   Ears - bilateral TM's and external ear canals normal   Mouth - mucous membranes moist, pharynx normal without lesions   Neck - supple, no significant adenopathy   Lymphatics - no palpable lymphadenopathy, no hepatosplenomegaly   Chest - clear to auscultation, no wheezes, rales or rhonchi, symmetric air entry   Heart - normal rate, regular rhythm, normal S1, S2, no murmurs, rubs, clicks or gallops   Abdomen - soft, nontender, nondistended, no masses or organomegaly   Neurological - alert, oriented, normal speech, no focal findings or movement disorder noted   Musculoskeletal - no joint tenderness, deformity or swelling   Extremities - peripheral pulses normal, no pedal edema, no clubbing or cyanosis   Skin - normal coloration and turgor, no rashes, no suspicious skin lesions noted ,      LABORATORY DATA:     Lab Results   Component Value Date    WBC 8.4 07/13/2022    HGB 13.9 07/13/2022    HCT 42.3 07/13/2022    MCV 96.4 07/13/2022     07/13/2022    LYMPHOPCT 14 (L) 07/13/2022    RBC 4.39 07/13/2022    MCH 31.7 07/13/2022    MCHC 32.9 07/13/2022    RDW 13.5 07/13/2022    NEUTOPHILPCT 72.9 07/23/2020    MONOPCT 8 07/13/2022    EOSPCT 0.2 07/23/2020    BASOPCT 1 07/13/2022    NEUTROABS 6.15 07/13/2022    LYMPHSABS 1.15 07/13/2022    MONOSABS 0.70 07/13/2022    EOSABS 0.26 07/13/2022 BASOSABS 0.06 07/13/2022         Chemistry        Component Value Date/Time     07/13/2022 0818    K 3.8 07/13/2022 0818     07/13/2022 0818    CO2 25 07/13/2022 0818    BUN 23 07/13/2022 0818    CREATININE 1.43 (H) 07/13/2022 0818        Component Value Date/Time    CALCIUM 9.0 07/13/2022 0818    ALKPHOS 139 (H) 07/13/2022 0818    AST 23 07/13/2022 0818    ALT 15 07/13/2022 0818    BILITOT 1.23 (H) 07/13/2022 0818            PATHOLOGY DATA:         IMAGING DATA:      Echocardiogram complete  [facility]    Transthoracic Echocardiography Report (TTE)     Patient Name Libra Roth Date of Study               06/01/2022      Date of      09/03/1933  Gender                      Male   Birth      Age          80 year(s)  Race                              Room Number              Height:                     67 inch, 170.18 cm      Corporate ID [id number]    Weight:                     182 pounds, 82.6 kg   #      Patient Acct [de-identified]   BSA:          1.94 m^2      BMI:      28.51   #                                                              kg/m^2      MR #         [id number]      Sonographer                 Laxmi Preciado      Accession #  [id number]  Interpreting Physician      [address]      Fellow                   Referring Nurse                            Practitioner      Interpreting             Referring Physician         Mari Linder MD   Fellow     Type of Study      TTE procedure:2D Echocardiogram, M-Mode, Doppler, Color Doppler. Procedure Date  Date: 06/01/2022 Start: 11:00 AM    Study Location: [address] [place] [address]  Technical Quality: Fair visualization    Indications:Cardiomyopathy-Unspecified. Patient Status: Outpatient    Height: 67 inches Weight: 182 pounds BSA: 1.94 m^2 BMI: 28.51 kg/m^2    Rhythm: Sinus bradycardia HR: 49 bpm BP: 115/67 mmHg    CONCLUSIONS    Summary  Technically difficult study.   Left ventricle is normal in size and wall thickness. Global left ventricular systolic function is normal. Estimated LV EF 65-70  %. No obvious wall motion abnormality seen. Evidence of severe (grade III)  diastolic dysfunction. Left atrium is severely dilated. Right atrium is moderately dilated . Normal right ventricular size and function. Mild aortic insufficiency. Mild tricuspid regurgitation. No pulmonary hypertension. Estimated right ventricular systolic pressure is  20DQJK. Aortic root dimension is at upper limit of normal.    Signature  ----------------------------------------------------------------------------   Electronically signed by Laxmi Preciado(Sonographer) on 06/01/2022 05:05   PM  ----------------------------------------------------------------------------    ----------------------------------------------------------------------------   Electronically signed by Andres Stafford(Interpreting physician) on 06/02/2022   09:42 AM  ----------------------------------------------------------------------------  FINDINGS  Left Atrium  Left atrium is severely dilated. Left Ventricle  Left ventricle is normal in size and wall thickness. Global left ventricular systolic function is normal.  Estimated LV EF 65-70 %. No obvious wall motion abnormality seen. Evidence of severe (grade III) diastolic dysfunction. Right Atrium  Right atrium is moderately dilated . Right Ventricle  Normal right ventricular size and function. Mitral Valve  No obvious valvular abnormality seen. No evidence of mitral regurgitation. Aortic Valve  No obvious valvular abnormality seen. Mild aortic insufficiency. Tricuspid Valve  No obvious valvular abnormality. Mild tricuspid regurgitation. No pulmonary hypertension. Estimated right ventricular systolic pressure is  35YMZY. Pulmonic Valve  Pulmonic valve was not well visualized. No evidence of pulmonic insufficiency or stenosis. Pericardial Effusion  No significant pericardial effusion is seen.   Pleural Effusion  No pleural effusion seen. Miscellaneous  Aortic root dimension is at upper limit of normal.  E/E' average = 7.1. M-mode / 2D Measurements & Calculations:      LVIDd:5.24 cm(3.7 - 5.6 cm)      Diastolic WWVEDQ:205 ml   VHEUN:5.84 cm(2.2 - 4.0 cm)      Systolic TDMXXK:40.1 ml   IVSd:0.87 cm(0.6 - 1.1 cm)       Aortic Root:3.9 cm(2.0 - 3.7 cm)   LVPWd:1.07 cm(0.6 - 1.1 cm)      LA Dimension: 3.9 cm(1.9 - 4.0 cm)   Fractional Shortenin.55 %    LA volume/Index: 136 ml /70m^2   Calculated LVEF (%): 76.81 %     LVOT:2.2 cm      Mitral:                                Aortic      Peak E-Wave: 0.86 m/s                  Peak Velocity: 1.43 m/s   Peak A-Wave: 0.22 m/s                  Mean Velocity: 0.85 m/s   E/A Ratio: 3.87                        Peak Gradient: 8.18 mmHg   Peak Gradient: 2.98 mmHg               Mean Gradient: 3 mmHg   Deceleration Time: 229 msec                                             Area (continuity): 2.22 cm^2                                          AV VTI: 25.7 cm      Tricuspid:                             Pulmonic:      Estimated RVSP: 15 mmHg   Peak TR Velocity: 1.78 m/s   Peak TR Gradient: 12.6736 mmHg   Estimated RA Pressure: 3 mmHg                                          Estimated PASP: 15.67 mmHg     Septal Wall E' velocity:0.10 m/s  Lateral Wall E' velocity:0.15 m/s       ASSESSMENT:       Diagnosis Orders   1. 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 History of prostate cancer     3. History of melanoma     4. History of bladder cancer     5. History of colon polyps          1. Gastric MALT lymphoma clinical stage I  2. Multiple comorbidity  3. Remote history of bladder cancer prostate cancer and recently angiosarcoma of the right cheek surgery done at [facility] with no evidence of recurrence  4.  Patient does not have congestive heart failure and ejection fraction 66% but diastolic dysfunction  5. High creatinine and total bilirubin    PLAN:     1. Reviewed lab work, imaging studies, outside records and discuss possible diagnosis and treatment recommendations  2. Patient had stage I gastric MALT lymphoma based on the imaging with no lymphadenopathy, this is indolent lymphoma and the standard of care would be observation/surveillance(for stage II or above) unless had symptoms or indication for treatment but with stage I there is a option of cure with ISRT(local radiation) or rituximab if ISRT is contraindicated, patient met with radiation oncology and did decide not to proceed with radiation treatment,  3. Patient agreed for Rituxan given 1 week over 4 times which started on June 29, 2022  4. Patient tolerated treatment well we will proceed with treatment plan today  5. Patient will be seen after done with the treatment in 4 weeks  6. With history of similar cancer may be eligible for genetic study  7. Encourage hydration and repeat CBC and CMP next week             I spent a total of 40 minutes on the date of the service which included preparing to see the patient, face-to-face patient care, completing clinical documentation, obtaining and/or reviewing separately obtained history, performing a medically appropriate examination, counseling and educating the patient/family/caregiver and ordering medications, tests, or procedures. Patient's conditions were taken into consideration when deciding risk-benefit for therapy. Patient is at high risk for drug interaction risk of complications. Given multiple comorbid conditions patient is at high risk for complication from intervention, risk of hospitalization, medical interaction overall life expectancy. NCCN guidelines were reviewed and discussed with the patient. The diagnosis and care plan were discussed with the patient in detail.  I discussed the natural history of the disease, prognosis, risks and goals of therapy and answered all the patients questions to the best of my ability. Patient expressed understanding and was in agreement. Thank you for the consult we will follow the patient with you                                                [facility]                            This note is created with the assistance of a speech recognition program.  While intending to generate a document that actually reflects the content of the visit, the document can still have some errors including those of syntax and sound a like substitutions which may escape proof reading. It such instances, actual meaning can be extrapolated by contextual diversion.

## 2022-07-13 NOTE — PROGRESS NOTES
Patient arrive  for cycle 12 day 1 treatment and physician visit. Denies complaint or concern. Vitals as charted. IV started per protocol . Labs reviewed  MD met with  patient; ok to proceed with treatment. Patient premedicated. Ruxience  infusion begin at 36 ml/hr x30 minutes with no sign of adverse reaction  Ruxience  increased to 71 ml hr x 30 minutes with no sign of adverse reaction  Ruxience  increased to 107 ml hr x 30 minutes with no sign of adverse reaction  Ruxience  increased to 143 ml hr for the remainder of infusion with no sign of adverse reaction  Line flushed  IV removed , pressure dressing applied   Patient discharged off unit .

## 2022-07-13 NOTE — FLOWSHEET NOTE
Writer visited with Patient's Spouse who was in the treatment cubicle of the infusion clinic. Patient appeared to be sleeping and did not interact during the visit. Spouse shared about her sources of support, including her spiritual beliefs and practices. Spouse expressed gratitude for Pt to whom she has been  for 71 years and affirmed his strengths. Writer offered supportive presence and active listening. Spouse thanked writer. 07/13/22 1349   Encounter Summary   Encounter Overview/Reason  Spiritual/Emotional Needs   Service Provided For: Family; Patient not available   Referral/Consult From: [facility]   Support System Spouse   Last Encounter  07/06/22   Complexity of Encounter Low   Begin Time 1335   End Time  1345   Total Time Calculated 10 min   Encounter    Type Follow up   Spiritual/Emotional needs   Type Spiritual Support   Assessment/Intervention/Outcome   Assessment Calm;Coping; Unable to assess   Intervention Sustaining Presence/Ministry of presence; Explored Coping Skills/Resources; Explored/Affirmed feelings, thoughts, concerns; Active listening;Discussed meaning/purpose;Discussed belief system/Pentecostal practices/ruby   Outcome Expressed Gratitude;Coping   Plan and Referrals   Plan/Referrals Continue Support (comment)     Electronically signed by Georgia Benz, Oncology Outpatient [facility], [facility]  7/13/2022  1:52 PM

## 2022-07-13 NOTE — TELEPHONE ENCOUNTER
Kamari Baird MD VISIT & [id number] [address]ou IN TO SEE PATIENT  ORDERS RECEIVED  OK FOR [place] TODAY  RV 4 WEEKS @ [facility] AND SPOKE TO [address], PT IS SCHEDULED TO SEE DR BROOKS ON 8/16/22 @ 9AM  AVS PRINTED AND GIVEN TO PATIENT WITH INSTRUCTIONS  PATIENT DISCHARGED AMBULATORY

## 2022-07-20 ENCOUNTER — TELEPHONE (OUTPATIENT)
Dept: ONCOLOGY | Age: 87
End: 2022-07-20

## 2022-07-20 ENCOUNTER — HOSPITAL ENCOUNTER (OUTPATIENT)
Dept: INFUSION THERAPY | Facility: MEDICAL CENTER | Age: 87
Discharge: HOME | End: 2022-07-20
Payer: COMMERCIAL

## 2022-07-20 ENCOUNTER — HOSPITAL ENCOUNTER (OUTPATIENT)
Age: 87
Setting detail: SPECIMEN
Discharge: HOME | End: 2022-07-20
Payer: COMMERCIAL

## 2022-07-20 VITALS
SYSTOLIC BLOOD PRESSURE: 111 MMHG | TEMPERATURE: 97.5 F | HEART RATE: 55 BPM | RESPIRATION RATE: 16 BRPM | DIASTOLIC BLOOD PRESSURE: 68 MMHG

## 2022-07-20 DIAGNOSIS — C88.4 EXTRANODAL MARGINAL ZONE LYMPHOMA OF MUCOSA-ASSOCIATED LYMPHOID TISSUE (MALT) OF STOMACH (HCC): Primary | ICD-10-CM

## 2022-07-20 DIAGNOSIS — C78.7 LIVER METASTASES (HCC): ICD-10-CM

## 2022-07-20 LAB
ABSOLUTE EOS #: 0.27 K/UL (ref 0–0.44)
ABSOLUTE IMMATURE GRANULOCYTE: 0.08 K/UL (ref 0–0.3)
ABSOLUTE LYMPH #: 1.29 K/UL (ref 1.1–3.7)
ABSOLUTE MONO #: 0.92 K/UL (ref 0.1–1.2)
ALBUMIN SERPL-MCNC: 3.9 G/DL (ref 3.5–5.2)
ALP BLD-CCNC: 141 U/L (ref 40–129)
ALT SERPL-CCNC: 14 U/L (ref 5–41)
ANION GAP SERPL CALCULATED.3IONS-SCNC: 9 MMOL/L (ref 9–17)
AST SERPL-CCNC: 21 U/L
BASOPHILS # BLD: 1 % (ref 0–2)
BASOPHILS ABSOLUTE: 0.07 K/UL (ref 0–0.2)
BILIRUB SERPL-MCNC: 1.03 MG/DL (ref 0.3–1.2)
BUN BLDV-MCNC: 25 MG/DL (ref 8–23)
BUN/CREAT BLD: 18 (ref 9–20)
CALCIUM SERPL-MCNC: 9.1 MG/DL (ref 8.6–10.4)
CHLORIDE BLD-SCNC: 101 MMOL/L (ref 98–107)
CO2: 26 MMOL/L (ref 20–31)
CREAT SERPL-MCNC: 1.4 MG/DL (ref 0.7–1.2)
EOSINOPHILS RELATIVE PERCENT: 3 % (ref 1–4)
GFR AFRICAN AMERICAN: 58 ML/MIN
GFR NON-AFRICAN AMERICAN: 48 ML/MIN
GFR SERPL CREATININE-BSD FRML MDRD: ABNORMAL ML/MIN/{1.73_M2}
GLUCOSE BLD-MCNC: 129 MG/DL (ref 70–99)
HCT VFR BLD CALC: 42.5 % (ref 40.7–50.3)
HEMOGLOBIN: 13.8 G/DL (ref 13–17)
IMMATURE GRANULOCYTES: 1 %
LYMPHOCYTES # BLD: 14 % (ref 24–43)
MCH RBC QN AUTO: 31.4 PG (ref 25.2–33.5)
MCHC RBC AUTO-ENTMCNC: 32.5 G/DL (ref 28.4–34.8)
MCV RBC AUTO: 96.6 FL (ref 82.6–102.9)
MONOCYTES # BLD: 10 % (ref 3–12)
NRBC AUTOMATED: 0 PER 100 WBC
PDW BLD-RTO: 13.5 % (ref 11.8–14.4)
PLATELET # BLD: 207 K/UL (ref 138–453)
PMV BLD AUTO: 10.3 FL (ref 8.1–13.5)
POTASSIUM SERPL-SCNC: 4 MMOL/L (ref 3.7–5.3)
RBC # BLD: 4.4 M/UL (ref 4.21–5.77)
SEG NEUTROPHILS: 71 % (ref 36–65)
SEGMENTED NEUTROPHILS ABSOLUTE COUNT: 6.46 K/UL (ref 1.5–8.1)
SODIUM BLD-SCNC: 136 MMOL/L (ref 135–144)
TOTAL PROTEIN: 6.3 G/DL (ref 6.4–8.3)
WBC # BLD: 9.1 K/UL (ref 3.5–11.3)

## 2022-07-20 PROCEDURE — 96415 CHEMO IV INFUSION ADDL HR: CPT

## 2022-07-20 PROCEDURE — 96413 CHEMO IV INFUSION 1 HR: CPT

## 2022-07-20 PROCEDURE — 80053 COMPREHEN METABOLIC PANEL: CPT

## 2022-07-20 PROCEDURE — 85025 COMPLETE CBC W/AUTO DIFF WBC: CPT

## 2022-07-20 PROCEDURE — 6370000000 HC RX 637 (ALT 250 FOR IP): Performed by: INTERNAL MEDICINE

## 2022-07-20 PROCEDURE — 36415 COLL VENOUS BLD VENIPUNCTURE: CPT

## 2022-07-20 PROCEDURE — 6360000002 HC RX W HCPCS: Performed by: INTERNAL MEDICINE

## 2022-07-20 PROCEDURE — 2580000003 HC RX 258: Performed by: INTERNAL MEDICINE

## 2022-07-20 RX ORDER — DIPHENHYDRAMINE HYDROCHLORIDE 50 MG/ML
25 INJECTION INTRAMUSCULAR; INTRAVENOUS ONCE
Status: COMPLETED | OUTPATIENT
Start: 2022-07-20 | End: 2022-07-20

## 2022-07-20 RX ORDER — ACETAMINOPHEN 325 MG/1
650 TABLET ORAL ONCE
Status: COMPLETED | OUTPATIENT
Start: 2022-07-20 | End: 2022-07-20

## 2022-07-20 RX ADMIN — SODIUM CHLORIDE 700 MG: 9 INJECTION, SOLUTION INTRAVENOUS at 11:00

## 2022-07-20 RX ADMIN — DIPHENHYDRAMINE HYDROCHLORIDE 25 MG: 50 INJECTION, SOLUTION INTRAMUSCULAR; INTRAVENOUS at 10:03

## 2022-07-20 RX ADMIN — ACETAMINOPHEN 650 MG: 325 TABLET ORAL at 10:03

## 2022-07-20 ASSESSMENT — PAIN SCALES - GENERAL: PAINLEVEL_OUTOF10: 0

## 2022-07-20 NOTE — TELEPHONE ENCOUNTER
Name: Ratna Villavicencio  : 9/3/1933  MRN: [id number]    Oncology Navigation Follow-Up Note    Contact Type:  Telephone  Notes: Pt @ [facility] for [place]. Met with pt & pt's spouse, Bambi Rajan, in infusion area. Pt & Bambi Rajan denied questions/concerns. Instructed pt & Bambi Rajan may contact writer prn. Will continue to follow.     Electronically signed by Mai Meyer RN on 2022 at 9:51 AM

## 2022-08-09 ENCOUNTER — TELEPHONE (OUTPATIENT)
Dept: INFUSION THERAPY | Facility: MEDICAL CENTER | Age: 87
End: 2022-08-09

## 2022-08-09 NOTE — TELEPHONE ENCOUNTER
Returned call to patient's home and spoke to his wife Jonathan Machado. Patient has had black tarry stools last 2 days. He has had 2 stools each day. He is feeling his usual self. No increase in fatigue or abdominal pain. He has an appetite. Recommended to Jonathan Machado to take him to ER to be evaluated. She voices understanding and is agreeable.

## 2022-08-16 ENCOUNTER — TELEPHONE (OUTPATIENT)
Dept: ONCOLOGY | Age: 87
End: 2022-08-16

## 2022-08-16 ENCOUNTER — OFFICE VISIT (OUTPATIENT)
Dept: ONCOLOGY | Age: 87
End: 2022-08-16
Payer: COMMERCIAL

## 2022-08-16 VITALS
SYSTOLIC BLOOD PRESSURE: 107 MMHG | BODY MASS INDEX: 28.04 KG/M2 | TEMPERATURE: 96.8 F | WEIGHT: 179 LBS | HEART RATE: 56 BPM | DIASTOLIC BLOOD PRESSURE: 62 MMHG

## 2022-08-16 DIAGNOSIS — Z85.51 HISTORY OF BLADDER CANCER: ICD-10-CM

## 2022-08-16 DIAGNOSIS — Z85.820 HISTORY OF MELANOMA: ICD-10-CM

## 2022-08-16 DIAGNOSIS — C88.4 EXTRANODAL MARGINAL ZONE LYMPHOMA OF MUCOSA-ASSOCIATED LYMPHOID TISSUE (MALT) OF STOMACH (HCC): Primary | ICD-10-CM

## 2022-08-16 DIAGNOSIS — Z85.46 HISTORY OF PROSTATE CANCER: ICD-10-CM

## 2022-08-16 PROCEDURE — 1123F ACP DISCUSS/DSCN MKR DOCD: CPT | Performed by: INTERNAL MEDICINE

## 2022-08-16 PROCEDURE — 99214 OFFICE O/P EST MOD 30 MIN: CPT | Performed by: INTERNAL MEDICINE

## 2022-08-16 NOTE — TELEPHONE ENCOUNTER
Avs from 8/16/22    Follow-up with GI at [facility] Dr. Melecio Nj from GI      Pt was given Dr. Melecio Nj office number to call and make appointment and once seen by MD they will schedule Endoscopy.   Writer will follow to schedule follow up appointment     PT was given AVS     Electronically signed by Juan Silva on 8/16/2022 at 9:42 AM

## 2022-08-16 NOTE — PROGRESS NOTES
Patient ID: Silas Jaeger, 9/3/1933, [id number], 80 y.o. Referred by :  Dee Dee Webber MD    Reason for consultation: Gastric marginal zone lymphoma      HISTORY OF PRESENT ILLNESS:    Oncologic History:    Silas Jaeger is a very pleasant 80 y.o. 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 [address] [place] [address],[address] with no radiation  Imaging was done including CT abdomen pelvis and MRI and no evidence of metastasis/cancer/lymphadenopathy  PET/CT and no evidence of lymphoma activity elsewhere other than stomach  There was diffuse low-level activity throughout the stomach and the small bowel without CT abnormality  Case discussed at the tumor board  Patient visited with radiation oncology and he decided not to get radiation and decided to start Rituxan weekly for 4 weeks which started on 6/29/2022 and given for fourth treatment        Interim history  Patient presents to the clinic for a follow-up visit and to discuss results of his lab work-up and other relevant clinical data. Overall patient has been tolerating treatment without unexpected or severe side effects. During this visit patient's allergy, social, medical, surgical history and medications were reviewed and updated.      Patient had epigastric pain started 4 days ago especially at night and he is on Protonix      Past Medical History:   Diagnosis Date    Bilateral edema of lower extremity 1995    Bilateral hearing loss     wears hearing aids    Diverticulosis of sigmoid colon     Gout     History of bladder cancer 1980    Dr. Tyrell Coronel yearly cysto    History of colon polyps 09/10/2012    History of hypokalemia     History of melanoma 2008    left cheek    History of prostate cancer 1992    S/P Prostatectomy    Hyperlipidemia     Hypertension     Tubular adenoma of colon 09/10/2012       Past Surgical History:   Procedure Laterality Date    COLONOSCOPY  2005    dr Darian Cho    COLONOSCOPY  09/10/2012    significant diverticulosis, tubular adenoma-sigmoid colon, small hemorrhoids    COLONOSCOPY  10/03/2016    significant diverticulosis sigmoid colon, small polyp rectum-hyperplastic    EYE SURGERY Bilateral 1994    cataracts removed    HERNIA REPAIR Right 1956    inguinal    HERNIA REPAIR Left 1995    inguinal    JOINT REPLACEMENT Right 1984    great toe joint D/T gout    KNEE ARTHROPLASTY Left 2000    meniscal repair    PROSTATE SURGERY  2002       Allergies   Allergen Reactions    Mirabegron      Elevated blood pressure       Current Outpatient Medications   Medication Sig Dispense Refill    potassium chloride (KLOR-CON M) 20 MEQ extended release tablet Take 1 tablet by mouth in the morning. 90 tablet 1    allopurinol (ZYLOPRIM) 300 MG tablet Take 1 tablet by mouth in the morning. 90 tablet 1    atorvastatin (LIPITOR) 20 MG tablet Take 1 tablet by mouth once daily 90 tablet 0    hydroCHLOROthiazide (HYDRODIURIL) 25 MG tablet Take 1 tablet by mouth in the morning. 90 tablet 1    pantoprazole (PROTONIX) 40 MG tablet Take 1 tablet by mouth in the morning. Take 1 tablet by mouth daily.  90 tablet 1    apixaban (ELIQUIS) 5 MG TABS tablet Take 5 mg by mouth 2 times daily      furosemide (LASIX) 20 MG tablet Take 20 mg by mouth daily as needed for Other      magnesium oxide (MAG-OX) 400 MG tablet Take 1 tablet by mouth daily 90 tablet 1    amLODIPine (NORVASC) 5 MG tablet TAKE 1 TABLET BY MOUTH ONCE DAILY 90 tablet 1    latanoprost (XALATAN) 0.005 % ophthalmic solution INSTILL 1 DROP INTO EACH EYE NIGHTLY 3 each 1    gabapentin (NEURONTIN) 100 MG capsule Take 1 capsule at night 90 capsule 1    lisinopril (PRINIVIL;ZESTRIL) 40 MG tablet Take 1 tablet by mouth daily 90 tablet 1    allopurinol (ZYLOPRIM) 300 MG tablet Take 1 tablet by mouth daily 90 tablet 1    timolol (TIMOPTIC) 0.5 % ophthalmic solution Place 1 drop into the left eye daily 3 each 1    olopatadine (PATANASE) 0.6 % SOLN nassl soln SPRAY 2 SPRAY(S) IN EACH NOSTRIL NIGHTLY 3 each 1    ipratropium (ATROVENT) 0.03 % nasal spray 2 sprays by Nasal route 3 times daily 3 each 1    potassium chloride (KLOR-CON M) 10 MEQ extended release tablet Take 1 tablet by mouth 2 times daily 30 tablet 0    tamsulosin (FLOMAX) 0.4 MG capsule Take 1 capsule by mouth daily (Patient not taking: Reported on 7/7/2021) 90 capsule 3     No current facility-administered medications for this visit. Social History     Socioeconomic History    Marital status:      Spouse name: Not on file    Number of children: Not on file    Years of education: Not on file    Highest education level: Not on file   Occupational History    Not on file   Tobacco Use    Smoking status: Never    Smokeless tobacco: Never   Vaping Use    Vaping Use: Never used   Substance and Sexual Activity    Alcohol use:  Yes     Alcohol/week: 10.0 standard drinks     Types: 10 Shots of liquor per week     Comment: 1-2 cocktails per week    Drug use: No    Sexual activity: Not on file   Other Topics Concern    Not on file   Social History Narrative    Not on file     Social Determinants of Health     Financial Resource Strain: Not on file   Food Insecurity: Not on file   Transportation Needs: Not on file   Physical Activity: Not on file   Stress: Not on file   Social Connections: Not on file   Intimate Partner Violence: Not on file   Housing Stability: Not on file       Family History   Problem Relation Age of Onset    Heart Attack Mother     Heart Attack Father         REVIEW OF SYSTEM:     Constitutional: No fever or chills. No night sweats, no weight loss   Eyes: No eye discharge, double vision, or eye pain   HEENT: negative for sore mouth, sore throat, hoarseness and voice change   Respiratory: negative for cough , sputum, dyspnea, wheezing, hemoptysis, chest pain   Cardiovascular: negative for chest pain, dyspnea, palpitations, orthopnea, PND   Gastrointestinal: negative for nausea, vomiting, diarrhea, constipation, abdominal pain, Dysphagia, hematemesis and hematochezia   Genitourinary: negative for frequency, dysuria, nocturia, urinary incontinence, and hematuria   Integument: negative for rash, skin lesions, bruises.    Hematologic/Lymphatic: negative for easy bruising, bleeding, lymphadenopathy, petechiae and swelling/edema   Endocrine: negative for heat or cold intolerance, tremor, weight changes, change in bowel habits and hair loss   Musculoskeletal: negative for myalgias, arthralgias, pain, joint swelling,and bone pain   Neurological: negative for headaches, dizziness, seizures, weakness, numbness       OBJECTIVE:         Vitals:    08/16/22 0903   BP: 107/62   Pulse: 56   Temp: 96.8 °F (36 °C)       PHYSICAL EXAM:   General appearance - well appearing, no in pain or distress   Mental status - alert and cooperative   Eyes - pupils equal and reactive, extraocular eye movements intact   Ears - bilateral TM's and external ear canals normal   Mouth - mucous membranes moist, pharynx normal without lesions   Neck - supple, no significant adenopathy   Lymphatics - no palpable lymphadenopathy, no hepatosplenomegaly   Chest - clear to auscultation, no wheezes, rales or rhonchi, symmetric air entry   Heart - normal rate, regular rhythm, normal S1, S2, no murmurs, rubs, clicks #                                                              kg/m^2      MR #         [id number]      Sonographer                 Laxmi Preciado      Accession #  [id number]  Interpreting Physician      [address]      Fellow                   Referring Nurse                            Practitioner      Interpreting             Referring Physician         Radha Kinsey MD   Fellow     Type of Study      TTE procedure:2D Echocardiogram, M-Mode, Doppler, Color Doppler. Procedure Date  Date: 06/01/2022 Start: 11:00 AM    Study Location: [address] [place] [address]  Technical Quality: Fair visualization    Indications:Cardiomyopathy-Unspecified. Patient Status: Outpatient    Height: 67 inches Weight: 182 pounds BSA: 1.94 m^2 BMI: 28.51 kg/m^2    Rhythm: Sinus bradycardia HR: 49 bpm BP: 115/67 mmHg    CONCLUSIONS    Summary  Technically difficult study. Left ventricle is normal in size and wall thickness. Global left ventricular systolic function is normal. Estimated LV EF 65-70  %. No obvious wall motion abnormality seen. Evidence of severe (grade III)  diastolic dysfunction. Left atrium is severely dilated. Right atrium is moderately dilated . Normal right ventricular size and function. Mild aortic insufficiency. Mild tricuspid regurgitation. No pulmonary hypertension. Estimated right ventricular systolic pressure is  41UKZW.   Aortic root dimension is at upper limit of normal.    Signature  ----------------------------------------------------------------------------   Electronically signed by Laxmi Preciado(Sonographer) on 06/01/2022 05:05   PM  ----------------------------------------------------------------------------    ----------------------------------------------------------------------------   Electronically signed by Emili StaffordInterpreting physician) on 06/02/2022   09:42 AM  ----------------------------------------------------------------------------  FINDINGS  Left Atrium  Left atrium is severely dilated. Left Ventricle  Left ventricle is normal in size and wall thickness. Global left ventricular systolic function is normal.  Estimated LV EF 65-70 %. No obvious wall motion abnormality seen. Evidence of severe (grade III) diastolic dysfunction. Right Atrium  Right atrium is moderately dilated . Right Ventricle  Normal right ventricular size and function. Mitral Valve  No obvious valvular abnormality seen. No evidence of mitral regurgitation. Aortic Valve  No obvious valvular abnormality seen. Mild aortic insufficiency. Tricuspid Valve  No obvious valvular abnormality. Mild tricuspid regurgitation. No pulmonary hypertension. Estimated right ventricular systolic pressure is  33BUPR. Pulmonic Valve  Pulmonic valve was not well visualized. No evidence of pulmonic insufficiency or stenosis. Pericardial Effusion  No significant pericardial effusion is seen. Pleural Effusion  No pleural effusion seen. Miscellaneous  Aortic root dimension is at upper limit of normal.  E/E' average = 7.1.     M-mode / 2D Measurements & Calculations:      LVIDd:5.24 cm(3.7 - 5.6 cm)      Diastolic WWUGHO:911 ml   FQUDL:0.05 cm(2.2 - 4.0 cm)      Systolic KVELIO:87.4 ml   IVSd:0.87 cm(0.6 - 1.1 cm)       Aortic Root:3.9 cm(2.0 - 3.7 cm)   LVPWd:1.07 cm(0.6 - 1.1 cm)      LA Dimension: 3.9 cm(1.9 - 4.0 cm)   Fractional Shortenin.55 %    LA volume/Index: 136 ml /70m^2   Calculated LVEF (%): 76.81 %     LVOT:2.2 cm      Mitral:                                Aortic      Peak E-Wave: 0.86 m/s                  Peak Velocity: 1.43 m/s   Peak A-Wave: 0.22 m/s                  Mean Velocity: 0.85 m/s   E/A Ratio: 3.87                        Peak Gradient: 8.18 mmHg   Peak Gradient: 2.98 mmHg               Mean Gradient: 3 mmHg   Deceleration Time: 229 msec                                             Area (continuity): 2.22 cm^2                                          AV VTI: 25.7 cm      Tricuspid: Pulmonic:      Estimated RVSP: 15 mmHg   Peak TR Velocity: 1.78 m/s   Peak TR Gradient: 12.6736 mmHg   Estimated RA Pressure: 3 mmHg                                          Estimated PASP: 15.67 mmHg     Septal Wall E' velocity:0.10 m/s  Lateral Wall E' velocity:0.15 m/s       ASSESSMENT:       Diagnosis Orders   1. 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 History of prostate cancer        3. History of bladder cancer        4.  History of melanoma               Gastric MALT lymphoma clinical stage I status post 4 treatments of Rituxan declined radiation  Multiple comorbidity  Remote history of bladder cancer prostate cancer and recently angiosarcoma of the right cheek surgery done at [address] [facility] with no evidence of recurrence  Patient does not have congestive heart failure and ejection fraction 11% but diastolic dysfunction  High creatinine and total bilirubin  New onset abdominal pain    PLAN:     Reviewed lab work, imaging studies, outside records and discuss possible diagnosis and treatment recommendations  Patient had stage I gastric MALT lymphoma based on the imaging with no lymphadenopathy, this is indolent lymphoma and the standard of care would be observation/surveillance(for stage II or above) unless had symptoms or indication for treatment but with stage I there is a option of cure with ISRT(local radiation) or rituximab if ISRT is contraindicated, patient met with radiation oncology and did decide not to proceed with radiation treatment,  Patient agreed for Rituxan given 1 week over 4 times which started on June 29, 2022  Patient tolerated treatment and done with treatment  Will proceed with posttreatment endoscopy  For the epigastric pain I think it is likely acid reflux continue Protonix and endoscopy as above  With history of similar cancer may be eligible for genetic study  RTC after endoscopy NCCN guidelines were reviewed and discussed with the patient. The diagnosis and care plan were discussed with the patient in detail. I discussed the natural history of the disease, prognosis, risks and goals of therapy and answered all the patients questions to the best of my ability. Patient expressed understanding and was in agreement. Thank you for the consult we will follow the patient with you                                                [facility]                            This note is created with the assistance of a speech recognition program.  While intending to generate a document that actually reflects the content of the visit, the document can still have some errors including those of syntax and sound a like substitutions which may escape proof reading. It such instances, actual meaning can be extrapolated by contextual diversion.

## 2022-08-23 ENCOUNTER — TELEPHONE (OUTPATIENT)
Dept: ONCOLOGY | Age: 87
End: 2022-08-23

## 2022-08-23 NOTE — TELEPHONE ENCOUNTER
Elaine PAT, RN Navigator. Per [facility] request I called Dr. Estrella Cough office to see if appt could be sooner that 9/16/22 or have the EGD done sooner. Left message at office to call [facility] or myself back, left [facility] and my numbers for call back.

## 2022-08-24 ENCOUNTER — TELEPHONE (OUTPATIENT)
Dept: ONCOLOGY | Age: 87
End: 2022-08-24

## 2022-08-24 NOTE — TELEPHONE ENCOUNTER
Name: Martha Mondragon  : 9/3/1933  MRN: [id number]    Oncology Navigation Follow-Up Note    Contact Type:  Telephone  Notes: Dr. Elly Martinez notified pt scheduled for Dr. Baylee De Jesus f/u  & attempted to move up. Dr. Elly Martinez stated okay to leave appt as scheduled. Elaine Carlisle., [address] oncology navigation, updated. Will continue to follow.     Electronically signed by Ar Johnson RN on 2022 at 2:53 PM

## 2022-09-16 ENCOUNTER — OFFICE VISIT (OUTPATIENT)
Dept: GASTROENTEROLOGY | Age: 87
End: 2022-09-16
Payer: COMMERCIAL

## 2022-09-16 VITALS — SYSTOLIC BLOOD PRESSURE: 117 MMHG | WEIGHT: 187 LBS | DIASTOLIC BLOOD PRESSURE: 60 MMHG | BODY MASS INDEX: 29.29 KG/M2

## 2022-09-16 DIAGNOSIS — C88.4 MALT LYMPHOMA (HCC): Primary | ICD-10-CM

## 2022-09-16 PROCEDURE — 99214 OFFICE O/P EST MOD 30 MIN: CPT | Performed by: INTERNAL MEDICINE

## 2022-09-16 PROCEDURE — APPSS45 APP SPLIT SHARED TIME 31-45 MINUTES: Performed by: NURSE PRACTITIONER

## 2022-09-16 PROCEDURE — 1123F ACP DISCUSS/DSCN MKR DOCD: CPT | Performed by: INTERNAL MEDICINE

## 2022-09-16 RX ORDER — ENOXAPARIN SODIUM 100 MG/ML
60 INJECTION SUBCUTANEOUS DAILY
Qty: 1.2 ML | Refills: 0 | Status: SHIPPED | OUTPATIENT
Start: 2022-09-16 | End: 2022-09-18

## 2022-09-16 ASSESSMENT — ENCOUNTER SYMPTOMS
GASTROINTESTINAL NEGATIVE: 1
EYES NEGATIVE: 1
ALLERGIC/IMMUNOLOGIC NEGATIVE: 1
RESPIRATORY NEGATIVE: 1

## 2022-09-16 NOTE — PROGRESS NOTES
GI OFFICE FOLLOW UP    INTERVAL HISTORY:   Radha Kinsey MD  [address] [facility],  [facility] [address]    Chief Complaint   Patient presents with    Other     Pt here to stacey doing egd        HISTORY OF PRESENT ILLNESS:     Being seen for follow-up. Patient has history of of gastric marginal zone lymphoma. H. pylori was negative. Patient was seen by oncologist Dr. Betty Henley. Imaging was done including CT abdomen and pelvis and MRI and no evidence of metastasis, lymphadenopathy. PET/CT and no evidence of lymphoma activity elsewhere other than stomach. Patient was given Rituxan x4 weeks. At present he is doing well. Denies any abdominal pain, nausea, vomiting. Did report some epigastric pain last month but was started on PPI therapy and this is since resolved. Tolerating diet well. No weight loss. Bowel movements are satisfactory. Was started on Eliquis in April for atrial fibrillation. Past Medical,Family, and Social History reviewed and does contribute to the patient presenting condition. Patient's PMH/PSH,SH,PSYCH Hx, MEDs, ALLERGIES, and ROS were all reviewed and updated in the appropriate sections.  Yes      PAST MEDICAL HISTORY:  Past Medical History:   Diagnosis Date    Bilateral edema of lower extremity 1995    Bilateral hearing loss     wears hearing aids    Diverticulosis of sigmoid colon     Gout     History of bladder cancer 1980    Dr. Kiko Gomez yearly cysto    History of colon polyps 09/10/2012    History of hypokalemia     History of melanoma 2008    left cheek    History of prostate cancer 1992    S/P Prostatectomy    Hyperlipidemia     Hypertension     Tubular adenoma of colon 09/10/2012       Past Surgical History:   Procedure Laterality Date    COLONOSCOPY  2005    dr Lola Mcnulty    COLONOSCOPY  09/10/2012    significant diverticulosis, tubular adenoma-sigmoid colon, small hemorrhoids    COLONOSCOPY  10/03/2016    significant diverticulosis sigmoid colon, small polyp rectum-hyperplastic    EYE SURGERY Bilateral 1994    cataracts removed    HERNIA REPAIR Right 1956    inguinal    HERNIA REPAIR Left 1995    inguinal    JOINT REPLACEMENT Right 1984    great toe joint D/T gout    KNEE ARTHROPLASTY Left 2000    meniscal repair    PROSTATE SURGERY  2002       CURRENT MEDICATIONS:    Current Outpatient Medications:     enoxaparin Sodium (LOVENOX) 30 MG/0.3ML injection, Inject 0.6 mLs into the skin daily for 2 days, Disp: 1.2 mL, Rfl: 0    allopurinol (ZYLOPRIM) 300 MG tablet, Take 1 tablet by mouth daily, Disp: 90 tablet, Rfl: 1    amLODIPine (NORVASC) 5 MG tablet, TAKE 1 TABLET BY MOUTH ONCE DAILY, Disp: 90 tablet, Rfl: 1    atorvastatin (LIPITOR) 20 MG tablet, Take 1 tablet by mouth once daily, Disp: 90 tablet, Rfl: 0    gabapentin (NEURONTIN) 100 MG capsule, Take 1 capsule at night, Disp: 90 capsule, Rfl: 1    hydroCHLOROthiazide (HYDRODIURIL) 25 MG tablet, Take 1 tablet by mouth daily, Disp: 90 tablet, Rfl: 1    lisinopril (PRINIVIL;ZESTRIL) 40 MG tablet, Take 1 tablet by mouth daily, Disp: 90 tablet, Rfl: 1    magnesium oxide (MAG-OX) 400 MG tablet, Take 1 tablet by mouth daily, Disp: 90 tablet, Rfl: 1    pantoprazole (PROTONIX) 40 MG tablet, Take 1 tablet by mouth daily Take 1 tablet by mouth daily, Disp: 90 tablet, Rfl: 1    potassium chloride (KLOR-CON M) 20 MEQ extended release tablet, Take 1 tablet by mouth daily, Disp: 90 tablet, Rfl: 1    allopurinol (ZYLOPRIM) 300 MG tablet, Take 1 tablet by mouth in the morning., Disp: 90 tablet, Rfl: 1    apixaban (ELIQUIS) 5 MG TABS tablet, Take 5 mg by mouth 2 times daily, Disp: , Rfl:     furosemide (LASIX) 20 MG tablet, Take 20 mg by mouth daily as needed for Other, Disp: , Rfl:     latanoprost (XALATAN) 0.005 % ophthalmic solution, INSTILL 1 DROP INTO EACH EYE NIGHTLY, Disp: 3 each, Rfl: 1    timolol (TIMOPTIC) 0.5 % ophthalmic solution, Place 1 drop into the left eye daily, Disp: 3 each, Rfl: 1    olopatadine (PATANASE) 0.6 % SOLN nassl soln, SPRAY 2 SPRAY(S) IN EACH NOSTRIL NIGHTLY, Disp: 3 each, Rfl: 1    ipratropium (ATROVENT) 0.03 % nasal spray, 2 sprays by Nasal route 3 times daily (Patient not taking: No sig reported), Disp: 3 each, Rfl: 1    tamsulosin (FLOMAX) 0.4 MG capsule, Take 1 capsule by mouth daily (Patient not taking: Reported on 7/7/2021), Disp: 90 capsule, Rfl: 3    ALLERGIES:   Allergies   Allergen Reactions    Mirabegron      Elevated blood pressure       FAMILY HISTORY:       Problem Relation Age of Onset    Heart Attack Mother     Heart Attack Father          SOCIAL HISTORY:   Social History     Socioeconomic History    Marital status:      Spouse name: Not on file    Number of children: Not on file    Years of education: Not on file    Highest education level: Not on file   Occupational History    Not on file   Tobacco Use    Smoking status: Never    Smokeless tobacco: Never   Vaping Use    Vaping Use: Never used   Substance and Sexual Activity    Alcohol use: Yes     Alcohol/week: 10.0 standard drinks     Types: 10 Shots of liquor per week     Comment: 1-2 cocktails per week    Drug use: No    Sexual activity: Not on file   Other Topics Concern    Not on file   Social History Narrative    Not on file     Social Determinants of Health     Financial Resource Strain: Not on file   Food Insecurity: Not on file   Transportation Needs: Not on file   Physical Activity: Insufficiently Active    Days of Exercise per Week: 5 days    Minutes of Exercise per Session: 10 min   Stress: Not on file   Social Connections: Not on file   Intimate Partner Violence: Not on file   Housing Stability: Not on file         REVIEW OF SYSTEMS:         Review of Systems   Constitutional: Negative. HENT: Negative. Eyes: Negative. Respiratory: Negative. Cardiovascular: Negative. Gastrointestinal: Negative. Endocrine: Negative. Genitourinary: Negative. Musculoskeletal: Negative. Skin: Negative. Allergic/Immunologic: Negative. Neurological: Negative. Hematological: Negative. Psychiatric/Behavioral: Negative. PHYSICAL EXAMINATION:     Vital signs reviewed per the nursing documentation. /60   Wt 187 lb (84.8 kg)   BMI 29.29 kg/m²   Body mass index is 29.29 kg/m². Physical Exam  Constitutional:       Appearance: Normal appearance. Eyes:      General: No scleral icterus. Pupils: Pupils are equal, round, and reactive to light. Cardiovascular:      Rate and Rhythm: Normal rate and regular rhythm. Heart sounds: Normal heart sounds. Pulmonary:      Effort: Pulmonary effort is normal.      Breath sounds: Normal breath sounds. Abdominal:      General: Bowel sounds are normal. There is no distension. Palpations: Abdomen is soft. There is no mass. Tenderness: There is no abdominal tenderness. There is no guarding. Musculoskeletal:      Right lower leg: Edema present. Left lower leg: Edema present. Skin:     General: Skin is warm and dry. Coloration: Skin is not jaundiced. Findings: Erythema present. Comments: Erythema BLE     Neurological:      Mental Status: He is alert and oriented to person, place, and time. Mental status is at baseline.          LABORATORY DATA: Reviewed  Lab Results   Component Value Date    WBC 9.1 07/20/2022    HGB 13.8 07/20/2022    HCT 42.5 07/20/2022    MCV 96.6 07/20/2022     07/20/2022     07/20/2022    K 4.0 07/20/2022     07/20/2022    CO2 26 07/20/2022    BUN 25 (H) 07/20/2022    CREATININE 1.40 (H) 07/20/2022    LABALBU 3.9 07/20/2022    BILITOT 1.03 07/20/2022    ALKPHOS 141 (H) 07/20/2022    AST 21 07/20/2022    ALT 14 07/20/2022         Lab Results   Component Value Date    RBC 4.40 07/20/2022    HGB 13.8 07/20/2022    MCV 96.6 07/20/2022    MCH 31.4 07/20/2022    MCHC 32.5 07/20/2022 RDW 13.5 07/20/2022    MPV 10.3 07/20/2022    BASOPCT 1 07/20/2022    LYMPHSABS 1.29 07/20/2022    MONOSABS 0.92 07/20/2022    NEUTROABS 6.46 07/20/2022    EOSABS 0.27 07/20/2022    BASOSABS 0.07 07/20/2022         DIAGNOSTIC TESTING:     No results found. Assessment  1. MALT lymphoma ([address]        Plan    Gastric MALT lymphoma status post Rituxan. We will plan for repeat EGD. Continue PPI for dyspeptic symptoms. Will hold Eliquis x3 days, bridge with Lovenox. The Endoscopic procedure was explained to the patient in detail  The prep and NPO were explained  All the Risks, Benefits, and Alternatives were explained  Risk of Bleeding, Perforation and Cardio Respiratory risks were explained  his questions were answered  The procedure has been scheduled with the  in the office  Patient was asked to give us a call for any questions  The patient has verbalized understanding and agreement to this plan. The patient was counseled at length about the risks of francia Covid-19 during their perioperative period and any recovery window from their procedure. The patient was made aware that francia Covid-19  may worsen their prognosis for recovering from their procedure  and lend to a higher morbidity and/or mortality risk. All material risks, benefits, and reasonable alternatives including postponing the procedure were discussed. The patient does wish to proceed with the procedure at this time. GI attending physician note. Patient seen with APRN     I independently performed an evaluation on Yoseph Kirkpatrick. I have reviewed the above documentation completed by the Nurse Practitioner and agree with the history, examination, and management plan. Recommendations discussed. At present patient is stable. Discussed with the patient and patient's wife regarding EGD to rebiopsy the stomach. Anticoagulation therapy management discussed with the GI APRN.           Thank you for allowing me to participate in the care of Mr. Omkar Wilson. For any further questions please do not hesitate to contact me. I have reviewed and agree with the ROS entered by the MA/LPN. Note is dictated utilizing voice recognition software. Unfortunately this leads to occasional typographical errors.  Please contact our office if you have any questions        Brianna Abad MD,FACP, [facility]  Board Certified in Gastroenterology and [address] [place] [address] Gastroenterology  Office #: [phone]

## 2022-09-19 ENCOUNTER — TELEPHONE (OUTPATIENT)
Dept: ONCOLOGY | Age: 87
End: 2022-09-19

## 2022-09-19 NOTE — TELEPHONE ENCOUNTER
Name: Cele Leonardo  : 9/3/1933  MRN: [id number]    Oncology Navigation Follow-Up Note    Contact Type:  Telephone    Notes: Upon review of chart noted pt scheduled for EGD 10/14. Spoke with pt's spouse, Tulio Moran, notified Jono Ramon may coordinate Dr. Shani Dumont f/u & inquired on [facility] preference. Tulio Moran [facility] Spoke with Renee Marsh, [address] [place] [address] , updated on pt & requested appt. Renee Marsh stated Dr. Shani Dumont on call week of 10/17. Dr. Shani Dumont updated on pt, date of EGD, & inquired on scheduling f/u. Dr. Shani Dumont stated okay to scheduled 10/25. Renee Marsh updated, pt scheduled 10/25 @ 1300. Tulio Moran updated, verbalized understanding, & denied questions/concerns. Coty Tafoya may contact writer prn. Will continue to follow.     Electronically signed by Jyotsna Ochoa RN on 2022 at 9:39 AM

## 2022-09-30 ENCOUNTER — HOSPITAL ENCOUNTER (OUTPATIENT)
Dept: PREADMISSION TESTING | Age: 87
Discharge: HOME | End: 2022-10-04

## 2022-09-30 VITALS — WEIGHT: 172 LBS | BODY MASS INDEX: 26.07 KG/M2 | HEIGHT: 68 IN

## 2022-09-30 NOTE — PROGRESS NOTES
Pre-op Instructions For Out-Patient Endoscopy Surgery    Medication Instructions:  Please stop herbs and any supplements now (includes vitamins and minerals). Please contact your surgeon and prescribing physician for pre-op instructions for any blood thinners. Eliquis      If you have inhalers/aerosol treatments at home, please use them the morning of your surgery and bring the inhalers with you to the hospital.    Please take the following medications the morning of your surgery with a sip of water:    Lisinopril and Amlodipine     Surgery Instructions:  After midnight before surgery:  Do not eat or drink anything, including water, mints, gum, and hard candy. You may brush your teeth without swallowing. No smoking, chewing tobacco, or street drugs. Please shower or bathe before surgery. Please do not wear any cologne, lotion, powder, jewelry, piercings, perfume, makeup, nail polish, hair accessories, or hair spray on the day of surgery. Wear loose comfortable clothing. Leave your valuables at home. Bring a storage case for any glasses/contacts. An adult who is responsible for you MUST drive you home and should be with you for the first 24 hours after surgery. The Day of Surgery:  Arrive at [facility] AT [facility] Surgery Entrance at the time directed by your surgeon and check in at the desk. If you have a living will or healthcare power of , please bring a copy. You will be taken to the pre-op holding area where you will be prepared for surgery. A physical assessment will be performed by a nurse practitioner or house officer. Your IV will be started and you will meet your anesthesiologist.    When you go to surgery, your family will be directed to the surgical waiting room, where the doctor should speak with them after your surgery.     After surgery, you will be taken to the recovery room then when you are awake and stable you will go to the short stay unit for preparation to be discharged. Instructions read to Joaquin Castro wife and understanding verbalized.      10/14/22 EGD

## 2022-10-13 ENCOUNTER — ANESTHESIA EVENT (OUTPATIENT)
Dept: ENDOSCOPY | Age: 87
End: 2022-10-13
Payer: COMMERCIAL

## 2022-10-14 ENCOUNTER — ANESTHESIA (OUTPATIENT)
Dept: ENDOSCOPY | Age: 87
End: 2022-10-14
Payer: COMMERCIAL

## 2022-10-14 ENCOUNTER — HOSPITAL ENCOUNTER (OUTPATIENT)
Age: 87
Setting detail: OUTPATIENT SURGERY
Discharge: HOME | End: 2022-10-14
Attending: INTERNAL MEDICINE | Admitting: INTERNAL MEDICINE
Payer: COMMERCIAL

## 2022-10-14 VITALS
WEIGHT: 172 LBS | TEMPERATURE: 97.1 F | RESPIRATION RATE: 16 BRPM | OXYGEN SATURATION: 98 % | DIASTOLIC BLOOD PRESSURE: 74 MMHG | SYSTOLIC BLOOD PRESSURE: 119 MMHG | BODY MASS INDEX: 26.07 KG/M2 | HEART RATE: 53 BPM | HEIGHT: 68 IN

## 2022-10-14 DIAGNOSIS — C88.4 MALT LYMPHOMA (HCC): ICD-10-CM

## 2022-10-14 PROCEDURE — 2580000003 HC RX 258: Performed by: ANESTHESIOLOGY

## 2022-10-14 PROCEDURE — 88342 IMHCHEM/IMCYTCHM 1ST ANTB: CPT

## 2022-10-14 PROCEDURE — 3700000001 HC ADD 15 MINUTES (ANESTHESIA): Performed by: INTERNAL MEDICINE

## 2022-10-14 PROCEDURE — 88341 IMHCHEM/IMCYTCHM EA ADD ANTB: CPT

## 2022-10-14 PROCEDURE — 43239 EGD BIOPSY SINGLE/MULTIPLE: CPT | Performed by: INTERNAL MEDICINE

## 2022-10-14 PROCEDURE — 2500000003 HC RX 250 WO HCPCS: Performed by: NURSE ANESTHETIST, CERTIFIED REGISTERED

## 2022-10-14 PROCEDURE — 3609012400 HC EGD TRANSORAL BIOPSY SINGLE/MULTIPLE: Performed by: INTERNAL MEDICINE

## 2022-10-14 PROCEDURE — 88305 TISSUE EXAM BY PATHOLOGIST: CPT

## 2022-10-14 PROCEDURE — 7100000010 HC PHASE II RECOVERY - FIRST 15 MIN: Performed by: INTERNAL MEDICINE

## 2022-10-14 PROCEDURE — 3700000000 HC ANESTHESIA ATTENDED CARE: Performed by: INTERNAL MEDICINE

## 2022-10-14 PROCEDURE — 6360000002 HC RX W HCPCS: Performed by: NURSE ANESTHETIST, CERTIFIED REGISTERED

## 2022-10-14 PROCEDURE — 88360 TUMOR IMMUNOHISTOCHEM/MANUAL: CPT

## 2022-10-14 PROCEDURE — 7100000011 HC PHASE II RECOVERY - ADDTL 15 MIN: Performed by: INTERNAL MEDICINE

## 2022-10-14 PROCEDURE — 2709999900 HC NON-CHARGEABLE SUPPLY: Performed by: INTERNAL MEDICINE

## 2022-10-14 RX ORDER — ONDANSETRON 2 MG/ML
4 INJECTION INTRAMUSCULAR; INTRAVENOUS
Status: DISCONTINUED | OUTPATIENT
Start: 2022-10-14 | End: 2022-10-14 | Stop reason: HOSPADM

## 2022-10-14 RX ORDER — LIDOCAINE HYDROCHLORIDE 10 MG/ML
INJECTION, SOLUTION INFILTRATION; PERINEURAL PRN
Status: DISCONTINUED | OUTPATIENT
Start: 2022-10-14 | End: 2022-10-14 | Stop reason: SDUPTHER

## 2022-10-14 RX ORDER — MEPERIDINE HYDROCHLORIDE 25 MG/ML
12.5 INJECTION INTRAMUSCULAR; INTRAVENOUS; SUBCUTANEOUS EVERY 5 MIN PRN
Status: DISCONTINUED | OUTPATIENT
Start: 2022-10-14 | End: 2022-10-14 | Stop reason: HOSPADM

## 2022-10-14 RX ORDER — FENTANYL CITRATE 50 UG/ML
25 INJECTION, SOLUTION INTRAMUSCULAR; INTRAVENOUS EVERY 5 MIN PRN
Status: DISCONTINUED | OUTPATIENT
Start: 2022-10-14 | End: 2022-10-14 | Stop reason: HOSPADM

## 2022-10-14 RX ORDER — HYDRALAZINE HYDROCHLORIDE 20 MG/ML
10 INJECTION INTRAMUSCULAR; INTRAVENOUS
Status: DISCONTINUED | OUTPATIENT
Start: 2022-10-14 | End: 2022-10-14 | Stop reason: HOSPADM

## 2022-10-14 RX ORDER — SODIUM CHLORIDE 9 MG/ML
INJECTION, SOLUTION INTRAVENOUS PRN
Status: DISCONTINUED | OUTPATIENT
Start: 2022-10-14 | End: 2022-10-14 | Stop reason: HOSPADM

## 2022-10-14 RX ORDER — LIDOCAINE HYDROCHLORIDE 10 MG/ML
1 INJECTION, SOLUTION EPIDURAL; INFILTRATION; INTRACAUDAL; PERINEURAL
Status: DISCONTINUED | OUTPATIENT
Start: 2022-10-14 | End: 2022-10-14 | Stop reason: HOSPADM

## 2022-10-14 RX ORDER — SODIUM CHLORIDE 0.9 % (FLUSH) 0.9 %
5-40 SYRINGE (ML) INJECTION EVERY 12 HOURS SCHEDULED
Status: DISCONTINUED | OUTPATIENT
Start: 2022-10-14 | End: 2022-10-14 | Stop reason: HOSPADM

## 2022-10-14 RX ORDER — SODIUM CHLORIDE, SODIUM LACTATE, POTASSIUM CHLORIDE, CALCIUM CHLORIDE 600; 310; 30; 20 MG/100ML; MG/100ML; MG/100ML; MG/100ML
INJECTION, SOLUTION INTRAVENOUS CONTINUOUS
Status: DISCONTINUED | OUTPATIENT
Start: 2022-10-14 | End: 2022-10-14 | Stop reason: HOSPADM

## 2022-10-14 RX ORDER — SODIUM CHLORIDE 0.9 % (FLUSH) 0.9 %
5-40 SYRINGE (ML) INJECTION PRN
Status: DISCONTINUED | OUTPATIENT
Start: 2022-10-14 | End: 2022-10-14 | Stop reason: HOSPADM

## 2022-10-14 RX ORDER — ENOXAPARIN SODIUM 100 MG/ML
30 INJECTION SUBCUTANEOUS 2 TIMES DAILY
COMMUNITY
Start: 2022-09-20

## 2022-10-14 RX ORDER — PROPOFOL 10 MG/ML
INJECTION, EMULSION INTRAVENOUS PRN
Status: DISCONTINUED | OUTPATIENT
Start: 2022-10-14 | End: 2022-10-14 | Stop reason: SDUPTHER

## 2022-10-14 RX ORDER — METOCLOPRAMIDE HYDROCHLORIDE 5 MG/ML
10 INJECTION INTRAMUSCULAR; INTRAVENOUS
Status: DISCONTINUED | OUTPATIENT
Start: 2022-10-14 | End: 2022-10-14 | Stop reason: HOSPADM

## 2022-10-14 RX ORDER — LABETALOL HYDROCHLORIDE 5 MG/ML
10 INJECTION, SOLUTION INTRAVENOUS
Status: DISCONTINUED | OUTPATIENT
Start: 2022-10-14 | End: 2022-10-14 | Stop reason: HOSPADM

## 2022-10-14 RX ORDER — DIPHENHYDRAMINE HYDROCHLORIDE 50 MG/ML
12.5 INJECTION INTRAMUSCULAR; INTRAVENOUS
Status: DISCONTINUED | OUTPATIENT
Start: 2022-10-14 | End: 2022-10-14 | Stop reason: HOSPADM

## 2022-10-14 RX ADMIN — LIDOCAINE HYDROCHLORIDE 40 MG: 10 INJECTION, SOLUTION INFILTRATION; PERINEURAL at 09:01

## 2022-10-14 RX ADMIN — SODIUM CHLORIDE, POTASSIUM CHLORIDE, SODIUM LACTATE AND CALCIUM CHLORIDE: 600; 310; 30; 20 INJECTION, SOLUTION INTRAVENOUS at 07:54

## 2022-10-14 RX ADMIN — PROPOFOL 70 MG: 10 INJECTION, EMULSION INTRAVENOUS at 09:01

## 2022-10-14 ASSESSMENT — ENCOUNTER SYMPTOMS
SINUS PRESSURE: 0
SHORTNESS OF BREATH: 0
VOMITING: 0
SORE THROAT: 0
ABDOMINAL PAIN: 0
CONSTIPATION: 0
ABDOMINAL DISTENTION: 0
CHEST TIGHTNESS: 0
COUGH: 0
BACK PAIN: 0
SINUS PAIN: 0
DIARRHEA: 0
WHEEZING: 0
SHORTNESS OF BREATH: 0
NAUSEA: 0
STRIDOR: 0
RHINORRHEA: 0
TROUBLE SWALLOWING: 0
APNEA: 0

## 2022-10-14 ASSESSMENT — PAIN - FUNCTIONAL ASSESSMENT: PAIN_FUNCTIONAL_ASSESSMENT: 0-10

## 2022-10-14 ASSESSMENT — LIFESTYLE VARIABLES: SMOKING_STATUS: 0

## 2022-10-14 NOTE — ANESTHESIA POSTPROCEDURE EVALUATION
POST- ANESTHESIA EVALUATION       Pt Name: Anthony Galdamez  MRN: [id number]  YOB: 1933  Date of evaluation: 10/14/2022  Time:  11:46 AM      /74   Pulse 53   Temp 97.1 °F (36.2 °C) (Infrared)   Resp 16   Ht 5' 8\" (1.727 m)   Wt 172 lb (78 kg)   SpO2 98%   BMI 26.15 kg/m²      Consciousness Level  Awake  Cardiopulmonary Status  Stable  Pain Adequately Treated YES  Nausea / Vomiting  NO  Adequate Hydration  YES  Anesthesia Related Complications NONE      Electronically signed by Andry Montague MD on 10/14/2022 at 11:46 AM       Department of Anesthesiology  Postprocedure Note    Patient: Anthony Galdamez  MRN: [id number]  YOB: 1933  Date of evaluation: 10/14/2022      Procedure Summary     Date: 10/14/22 Room / Location: [place] [address] 01 / [facility]    Anesthesia Start: 7837 Anesthesia Stop: 3282    Procedure: EGD BIOPSY (Esophagus) Diagnosis:       MALT lymphoma ([address]      (MALT lymphoma ([facility] [address] [C88.4])    Surgeons: Oj Marquez MD Responsible Provider: Andry Montague MD    Anesthesia Type: general ASA Status: 3          Anesthesia Type: No value filed.     Chema Phase I:      Chema Phase II: Chema Score: 10      Anesthesia Post Evaluation

## 2022-10-14 NOTE — H&P
HISTORY and [address]       NAME:  Krishna Brunson  MRN: [id number]   YOB: 1933   Date: 10/14/2022   Age: 80 y.o. Gender: male       COMPLAINT AND PRESENT HISTORY:   Krishna Brunson  is a 80 y.o. male presenting today for EGD BIOPSY as r/t MALT lymphoma ([facility] [address] Pt denies any gi symptoms including n/v/d/c, no abdominal pain, no bloody or tarry stools. Pt has hx of MALT, wife states this is just a screening. Pt extremely hard of hearing. Pt has a PMHX significant for HLD, HTN        NPO since midnight. Sip of water with meds. Pt does not wear dentures. Pt denies any hx of MRSA infection  Pt is on eliquis, held 3 days bridged with lovenox  around 5pm last night last dose   Pt denies any personal or FHx of complications with anesthesia. Pt denies any acute symptoms of illness at this time including no SOB, CP, fever, URI or UTI symptoms. RECENT IMAGING    No results found.      PAST MEDICAL HISTORY     Past Medical History:   Diagnosis Date    Bilateral edema of lower extremity 1995    Bilateral hearing loss     wears hearing aids    Diverticulosis of sigmoid colon     Gout     History of bladder cancer 1980    Dr. Marco Antonio Esparza yearly cysto    History of colon polyps 09/10/2012    History of hypokalemia     History of melanoma 2008    left cheek    History of prostate cancer 1992    S/P Prostatectomy    Hyperlipidemia     Hypertension     Tubular adenoma of colon 09/10/2012       SURGICAL HISTORY       Past Surgical History:   Procedure Laterality Date    COLONOSCOPY  2005    dr Chelsy Redd    COLONOSCOPY  09/10/2012    significant diverticulosis, tubular adenoma-sigmoid colon, small hemorrhoids    COLONOSCOPY  10/03/2016    significant diverticulosis sigmoid colon, small polyp rectum-hyperplastic    EYE SURGERY Bilateral 1994    cataracts removed    HERNIA REPAIR Right 1956    inguinal    HERNIA REPAIR Left 1995    inguinal    JOINT REPLACEMENT Right 1984    great toe joint D/T gout    KNEE ARTHROPLASTY Left 2000    meniscal repair    PROSTATE SURGERY  2002       FAMILY HISTORY       Family History   Problem Relation Age of Onset    Heart Attack Mother     Heart Attack Father        SOCIAL HISTORY       Social History     Socioeconomic History    Marital status:    Tobacco Use    Smoking status: Never    Smokeless tobacco: Never   Vaping Use    Vaping Use: Never used   Substance and Sexual Activity    Alcohol use: Yes     Alcohol/week: 10.0 standard drinks     Types: 10 Shots of liquor per week     Comment: 1-2 cocktails per week    Drug use: No     Social Determinants of Health     Physical Activity: Insufficiently Active    Days of Exercise per Week: 5 days    Minutes of Exercise per Session: 10 min           REVIEW OF SYSTEMS      Allergies   Allergen Reactions    Mirabegron      Elevated blood pressure       No current facility-administered medications on file prior to encounter. Current Outpatient Medications on File Prior to Encounter   Medication Sig Dispense Refill    atorvastatin (LIPITOR) 20 MG tablet Take 1 tablet by mouth once daily 90 tablet 0    gabapentin (NEURONTIN) 100 MG capsule Take 1 capsule at night 90 capsule 1    hydroCHLOROthiazide (HYDRODIURIL) 25 MG tablet Take 1 tablet by mouth daily 90 tablet 1    lisinopril (PRINIVIL;ZESTRIL) 40 MG tablet Take 1 tablet by mouth daily 90 tablet 1    magnesium oxide (MAG-OX) 400 MG tablet Take 1 tablet by mouth daily 90 tablet 1    pantoprazole (PROTONIX) 40 MG tablet Take 1 tablet by mouth daily Take 1 tablet by mouth daily 90 tablet 1    potassium chloride (KLOR-CON M) 20 MEQ extended release tablet Take 1 tablet by mouth daily 90 tablet 1    allopurinol (ZYLOPRIM) 300 MG tablet Take 1 tablet by mouth in the morning.  90 tablet 1    apixaban (ELIQUIS) 5 MG TABS tablet Take 5 mg by mouth 2 times daily      furosemide (LASIX) 20 MG tablet Take 20 mg by mouth daily as needed for Other      timolol (TIMOPTIC) 0.5 % ophthalmic solution Place 1 drop into the left eye daily 3 each 1    olopatadine (PATANASE) 0.6 % SOLN nassl soln SPRAY 2 SPRAY(S) IN EACH NOSTRIL NIGHTLY 3 each 1        Review of Systems   Constitutional:  Negative for chills, diaphoresis, fatigue and fever. HENT:  Positive for hearing loss. Negative for congestion, dental problem, ear pain, postnasal drip, rhinorrhea, sinus pressure, sinus pain, sore throat and trouble swallowing. Bilateral    Respiratory:  Negative for apnea, cough, chest tightness, shortness of breath and wheezing. Cardiovascular:  Negative for chest pain, palpitations and leg swelling. Gastrointestinal:  Negative for abdominal distention, abdominal pain, constipation, diarrhea, nausea and vomiting. Genitourinary:  Negative for dysuria, flank pain, frequency and hematuria. Musculoskeletal:  Positive for gait problem. Negative for back pain, joint swelling and myalgias. Uses cane    Skin:  Negative for rash and wound. Neurological:  Negative for dizziness, weakness, numbness and headaches. Hematological:  Does not bruise/bleed easily. Psychiatric/Behavioral:  Negative for agitation and confusion. The patient is not nervous/anxious. See HPI    GENERAL PHYSICAL EXAM:     Vitals: See nurse flow sheet     Physical Exam  Exam conducted with a chaperone present (wife, pt extremely [place]). Constitutional:       General: He is not in acute distress. Appearance: Normal appearance. He is well-developed and normal weight. He is not ill-appearing or toxic-appearing. HENT:      Head: Normocephalic and atraumatic. Ears:      Comments: Bilateral HA     Mouth/Throat:      Mouth: Mucous membranes are dry. Pharynx: Oropharynx is clear. No oropharyngeal exudate or posterior oropharyngeal erythema. Eyes:      Extraocular Movements: Extraocular movements intact.       Conjunctiva/sclera: Conjunctivae normal.      Pupils: Pupils are equal, round, and reactive to light. Comments: Tearing    Cardiovascular:      Rate and Rhythm: Normal rate and regular rhythm. Pulses: Normal pulses. Heart sounds: Normal heart sounds. No murmur heard. No friction rub. No gallop. Pulmonary:      Effort: Pulmonary effort is normal.      Breath sounds: Normal breath sounds. No wheezing. Abdominal:      General: Bowel sounds are normal. There is no distension. Palpations: Abdomen is soft. Tenderness: There is no abdominal tenderness. There is no guarding. Comments: Hyperactive BS    Musculoskeletal:         General: No swelling. Normal range of motion. Cervical back: Normal range of motion and neck supple. No rigidity or tenderness. Right lower leg: No edema. Left lower leg: No edema. Skin:     General: Skin is warm and dry. Findings: No erythema. Neurological:      General: No focal deficit present. Mental Status: He is alert and oriented to person, place, and time. Mental status is at baseline. Sensory: No sensory deficit. Motor: Weakness present. Gait: Gait abnormal.      Comments: Cane, in wheel chair. Psychiatric:         Mood and Affect: Mood normal.         Behavior: Behavior normal.         Thought Content:  Thought content normal.         Judgment: Judgment normal.                                                                                       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 ([address] 04/19/2022    Neuropathic pain of left foot 04/10/2018    Nocturia 04/18/2017    Aortic root dilatation ([address] 11/30/2016    Diverticulosis of large intestine without hemorrhage 11/30/2016    Chronic gout without tophus 08/30/2016    Essential hypertension 05/24/2016    Pure hypercholesterolemia 05/24/2016    Bilateral edema of lower extremity     History of prostate cancer     History of bladder cancer     History of melanoma     Bilateral hearing loss     Tubular adenoma of colon 09/10/2012    History of colon polyps 09/10/2012               CATHERINE Huber - CNP on 10/14/2022 at 7:08 AM

## 2022-10-14 NOTE — DISCHARGE INSTRUCTIONS
To take Eliquis from Sunday    If any melanotic stools to contact me  To see me in the office in the next 2 to 3 weeks. EGD DISCHARGE INSTRUCTIONS    Activity:  Rest today. No driving, operating machinery, or making any important decisions today. May resume normal activity tomorrow. Diet:  Following EGD eat slowly, chew food well, cut food into small pieces-Avoid greasy, spicy, \"crunchy\" foods X 48 hours. Call your Doctor if you have any of the following:  -Passing blood rectally or vomiting blood (it may be red or black). -Persistent nausea/vomiting  -Severe abdominal or chest pain not relieved with passing gas  -Fever of 100 degrees or more  -Redness or swelling at the IV site  -Severe sore throat or neck pain .

## 2022-10-14 NOTE — OP NOTE
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 Patient was treated with Rituxan for 4 weeks. Patient is referred for EGD to evaluate status of lymphoma    POSTOPERATIVE DIAGNOSIS: Prominent fold in the upper part of the stomach towards the fundus from which biopsies taken to check for marginal zone lymphoma    OPERATION: Upper GI endoscopy with Biopsy    ANESTHESIA: MAC    ESTIMATED BLOOD LOSS: None    COMPLICATIONS: None. SPECIMENS:  Was Obtained: Prominent gastric fold in the upper part of the stomach to check for marginal zone lymphoma/MALT lymphoma    HISTORY: The patient is a 80y.o. year old male with history of above preop diagnosis. I recommended esophagogastroduodenoscopy with possible biopsy and I explained the risk, benefits, expected outcome, and alternatives to the procedure. Risks included but are not limited to bleeding, infection, respiratory distress, hypotension, and perforation of the esophagus, stomach, or duodenum. Patient understands and is in agreement. PROCEDURE: The patient was given IV conscious sedation. The patient's SPO2 remained above 90% throughout the procedure. Cetacaine spray given. Patient placed in left lateral position. Olympus  videogastroscope was inserted orally under vision into the esophagus without difficulty and advanced into the stomach then through the pylorus up to the second part of duodenum. Findings:    Retropharyngeal area was grossly normal appearing    Esophagus: normal    Stomach:    Fundus and Cardia Examined in Retroflexed View: abnormal: Prominent fold in the upper part of the stomach towards the fundus towards the greater curvature. Multiple biopsies taken to evaluate possibility of lymphoma.     Body: normal    Antrum: normal    Duodenum:     Descending: normal    Bulb: normal    While withdrawing the scope the above findings were verified and the scope was removed. The patient has tolerated the procedure without unusual events.          Recommendations/Plan:   F/U Biopsies  F/U In Office as instructed  Discussed with the family                   Electronically signed by Maida Tinajero MD  on 10/14/2022 at 9:11 AM

## 2022-10-14 NOTE — ANESTHESIA PRE PROCEDURE
Department of Anesthesiology  Preprocedure Note       Name:  Lexa Carrero   Age:  80 y.o.  :  9/3/1933                                          MRN:  [id number]         Date:  10/14/2022      Surgeon: Estiven Alfaro):  Manish Headley MD    Procedure: Procedure(s):  EGD BIOPSY    Medications prior to admission:   Prior to Admission medications    Medication Sig Start Date End Date Taking? Authorizing Provider   enoxaparin Sodium (LOVENOX) 30 MG/0.3ML injection Inject 30 mg into the skin in the morning and at bedtime 22  Yes Historical Provider, MD   latanoprost (XALATAN) 0.005 % ophthalmic solution INSTILL 1 DROP INTO EACH EYE NIGHTLY 10/14/22   Antonio Marie MD   amLODIPine (NORVASC) 5 MG tablet TAKE 1 TABLET BY MOUTH ONCE DAILY 10/10/22   CATHERINE Fong CNP   atorvastatin (LIPITOR) 20 MG tablet Take 1 tablet by mouth once daily 22   Antonio Marie MD   gabapentin (NEURONTIN) 100 MG capsule Take 1 capsule at night 22  Antonio Marie MD   hydroCHLOROthiazide (HYDRODIURIL) 25 MG tablet Take 1 tablet by mouth daily 22   Antonio Marie MD   lisinopril (PRINIVIL;ZESTRIL) 40 MG tablet Take 1 tablet by mouth daily 22   Antonio Marie MD   magnesium oxide (MAG-OX) 400 MG tablet Take 1 tablet by mouth daily 22   Antonio Marie MD   pantoprazole (PROTONIX) 40 MG tablet Take 1 tablet by mouth daily Take 1 tablet by mouth daily 22   Antonio Marie MD   potassium chloride (KLOR-CON M) 20 MEQ extended release tablet Take 1 tablet by mouth daily 22   Antonio Marie MD   allopurinol (ZYLOPRIM) 300 MG tablet Take 1 tablet by mouth in the morning.  8/15/22   Antonio Marie MD   apixaban (ELIQUIS) 5 MG TABS tablet Take 5 mg by mouth 2 times daily    Historical Provider, MD   furosemide (LASIX) 20 MG tablet Take 20 mg by mouth daily as needed for Other    Historical Provider, MD   timolol (TIMOPTIC) 0.5 % ophthalmic solution Place 1 drop into the left eye daily 1/31/22   [place] MD Matthew   olopatadine (PATANASE) 0.6 % SOLN nassl soln SPRAY 2 SPRAY(S) IN EACH NOSTRIL NIGHTLY 1/31/22   [place]chleaf MD Matthew       Current medications:    Current Facility-Administered Medications   Medication Dose Route Frequency Provider Last Rate Last Admin    sodium chloride flush 0.9 % injection 5-40 mL  5-40 mL IntraVENous 2 times per day Tomeka Carver MD        sodium chloride flush 0.9 % injection 5-40 mL  5-40 mL IntraVENous PRN Sly Plummer MD        0.9 % sodium chloride infusion   IntraVENous PRN Tomeka Carver MD        lactated ringers infusion   IntraVENous Continuous Tomeka Carver  mL/hr at 10/14/22 0754 New Bag at 10/14/22 0754    lidocaine PF 1 % injection 1 mL  1 mL IntraDERmal Once PRN Tomeka Carver MD           Allergies: Allergies   Allergen Reactions    Mirabegron      Elevated blood pressure       Problem List:    Patient Active Problem List   Diagnosis Code    Essential hypertension I10    Pure hypercholesterolemia E78.00    Bilateral edema of lower extremity R60.0    History of prostate cancer Z85.46    History of bladder cancer Z85.51    History of melanoma Z85.820    Bilateral hearing loss H91.93    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 Juan C Ramirez yearly cysto    History of colon polyps 09/10/2012    History of hypokalemia     History of melanoma 2008    left cheek    History of prostate cancer 1992    S/P Prostatectomy    Hyperlipidemia     Hypertension     Tubular adenoma of colon 09/10/2012       Past Surgical History:        Procedure Laterality Date    COLONOSCOPY  2005    dr Sigrid Ortiz  09/10/2012    significant diverticulosis, tubular adenoma-sigmoid colon, small hemorrhoids    COLONOSCOPY  10/03/2016    significant diverticulosis sigmoid colon, small polyp rectum-hyperplastic    EYE SURGERY Bilateral 1994    cataracts removed    HERNIA REPAIR Right 1956    inguinal    HERNIA REPAIR Left 1995    inguinal    JOINT REPLACEMENT Right 1984    great toe joint D/T gout    KNEE ARTHROPLASTY Left 2000    meniscal repair    PROSTATE SURGERY  2002       Social History:    Social History     Tobacco Use    Smoking status: Never    Smokeless tobacco: Never   Substance Use Topics    Alcohol use: Yes     Alcohol/week: 10.0 standard drinks     Types: 10 Shots of liquor per week     Comment: 1-2 cocktails per week                                Counseling given: Not Answered      Vital Signs (Current):   Vitals:    10/14/22 0744   BP: 126/69   Pulse: (!) 47   Resp: 16   Temp: 96.9 °F (36.1 °C)   TempSrc: Infrared   SpO2: 97%   Weight: 172 lb (78 kg)   Height: 5' 8\" (1.727 m)                                              BP Readings from Last 3 Encounters:   10/14/22 126/69   09/16/22 117/60   08/31/22 (!) 110/58       NPO Status: Time of last liquid consumption: 1700                        Time of last solid consumption: 1700                        Date of last liquid consumption: 10/13/22                        Date of last solid food consumption: 10/13/22    BMI:   Wt Readings from Last 3 Encounters:   10/14/22 172 lb (78 kg)   09/30/22 172 lb (78 kg)   09/16/22 187 lb (84.8 kg)     Body mass index is 26.15 kg/m².     CBC:   Lab Results   Component Value Date/Time    WBC 9.1 07/20/2022 08:46 AM    RBC 4.40 07/20/2022 08:46 AM    HGB 13.8 07/20/2022 08:46 AM    HCT 42.5 07/20/2022 08:46 AM    MCV 96.6 07/20/2022 08:46 AM    RDW 13.5 07/20/2022 08:46 AM     07/20/2022 08:46 AM     LR    CMP:   Lab Results   Component Value Date/Time     07/20/2022 08:46 AM    K 4.0 07/20/2022 08:46 AM     07/20/2022 08:46 AM    CO2 26 07/20/2022 08:46 AM    BUN 25 07/20/2022 08:46 AM    CREATININE 1.40 07/20/2022 08:46 AM    GFRAA 58 07/20/2022 08:46 AM    LABGLOM 48 07/20/2022 08:46 AM    GLUCOSE 129 07/20/2022 08:46 AM    PROT 6.3 07/20/2022 08:46 AM    PROT 6.2 04/22/2019 12:00 AM    CALCIUM 9.1 07/20/2022 08:46 AM    BILITOT 1.03 07/20/2022 08:46 AM    ALKPHOS 141 07/20/2022 08:46 AM    AST 21 07/20/2022 08:46 AM    ALT 14 07/20/2022 08:46 AM       POC Tests: No results for input(s): POCGLU, POCNA, POCK, POCCL, POCBUN, POCHEMO, POCHCT in the last 72 hours. Coags: No results found for: PROTIME, INR, APTT    HCG (If Applicable): No results found for: PREGTESTUR, PREGSERUM, HCG, HCGQUANT     ABGs: No results found for: PHART, PO2ART, PAL2QWM, BJA2CFG, BEART, Z7NTGDRD     Type & Screen (If Applicable):  No results found for: LABABO, LABRH    Drug/Infectious Status (If Applicable):  No results found for: HIV, HEPCAB    COVID-19 Screening (If Applicable): No results found for: COVID19        Anesthesia Evaluation  Patient summary reviewed and Nursing notes reviewed no history of anesthetic complications:   Airway: Mallampati: III  TM distance: >3 FB   Neck ROM: full  Mouth opening: > = 3 FB   Dental: normal exam         Pulmonary:Negative Pulmonary ROS and normal exam  breath sounds clear to auscultation      (-) pneumonia, COPD, asthma, shortness of breath, recent URI, sleep apnea, rhonchi, wheezes, rales, stridor, not a current smoker and no decreased breath sounds          Patient did not smoke on day of surgery.                  Cardiovascular:  Exercise tolerance: good (>4 METS),   (+) hypertension: no interval change,     (-) pacemaker, valvular problems/murmurs, past MI, CAD, CABG/stent, dysrhythmias,  angina,  CHF, orthopnea, PND,  GAYTAN, murmur, weak pulses,  friction rub, systolic click, carotid bruit,  JVD, peripheral edema, no pulmonary hypertension and no hyperlipidemia      Rhythm: regular  Rate: normal  Echocardiogram reviewed         Beta Blocker:  Not on Beta Blocker         Neuro/Psych:   Negative Neuro/Psych ROS     (-) seizures, neuromuscular disease, TIA, CVA, headaches, psychiatric history and depression/anxiety            GI/Hepatic/Renal: Neg GI/Hepatic/Renal ROS       (-) hiatal hernia, GERD, PUD, hepatitis, liver disease, no renal disease, bowel prep and no morbid obesity       Endo/Other: Negative Endo/Other ROS   (+) no malignancy/cancer. (-) diabetes mellitus, hypothyroidism, hyperthyroidism, blood dyscrasia, arthritis, no electrolyte abnormalities, no malignancy/cancer               Abdominal:             Vascular: negative vascular ROS. - PVD, DVT and PE. Other Findings:           Anesthesia Plan      general     ASA 3       Induction: intravenous. MIPS: Postoperative opioids intended and Prophylactic antiemetics administered. Anesthetic plan and risks discussed with patient.                         Renne Dandy, MD   10/14/2022

## 2022-10-18 ENCOUNTER — TELEPHONE (OUTPATIENT)
Dept: GASTROENTEROLOGY | Age: 87
End: 2022-10-18

## 2022-10-19 LAB — SURGICAL PATHOLOGY REPORT: NORMAL

## 2022-10-25 ENCOUNTER — OFFICE VISIT (OUTPATIENT)
Dept: ONCOLOGY | Age: 87
End: 2022-10-25
Payer: COMMERCIAL

## 2022-10-25 ENCOUNTER — TELEPHONE (OUTPATIENT)
Dept: ONCOLOGY | Age: 87
End: 2022-10-25

## 2022-10-25 VITALS
SYSTOLIC BLOOD PRESSURE: 120 MMHG | TEMPERATURE: 97.6 F | BODY MASS INDEX: 26.15 KG/M2 | HEART RATE: 73 BPM | WEIGHT: 172 LBS | DIASTOLIC BLOOD PRESSURE: 77 MMHG

## 2022-10-25 DIAGNOSIS — C88.4 MALT LYMPHOMA (HCC): Primary | ICD-10-CM

## 2022-10-25 PROCEDURE — 1123F ACP DISCUSS/DSCN MKR DOCD: CPT | Performed by: INTERNAL MEDICINE

## 2022-10-25 PROCEDURE — 99214 OFFICE O/P EST MOD 30 MIN: CPT | Performed by: INTERNAL MEDICINE

## 2022-10-25 NOTE — TELEPHONE ENCOUNTER
AVS FROM 10/25/22    RTC in 4 MONTHS with repeated PET scan and CBC and CMP      Rv on 2/14/23     Pet on 2/7/23 @ 10      Labs drawn week prior (cbc,cmp)       PT was given AVS and appt schedule    Electronically signed by Kennedy Loving on 10/25/2022 at 12:35 PM

## 2022-10-25 NOTE — PROGRESS NOTES
Patient ID: Donita Birch, 9/3/1933, [id number], 80 y.o. Referred by :  Aakash Portillo MD    Reason for consultation: Gastric marginal zone lymphoma      HISTORY OF PRESENT ILLNESS:    Oncologic History:    Donita Birch is a very pleasant 80 y.o. 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 [facility] with no radiation  Imaging was done including CT abdomen pelvis and MRI and no evidence of metastasis/cancer/lymphadenopathy  PET/CT and no evidence of lymphoma activity elsewhere other than stomach  There was diffuse low-level activity throughout the stomach and the small bowel without CT abnormality  Case discussed at the tumor board  Patient visited with radiation oncology and he decided not to get radiation and decided to start Rituxan weekly for 4 weeks which started on 6/29/2022 and given for 4 times tolerated treatment well        Interim history  Patient presents to the clinic for a follow-up visit and to discuss results of his lab work-up and other relevant clinical data. Overall patient has been tolerating treatment without unexpected or severe side effects. During this visit patient's allergy, social, medical, surgical history and medications were reviewed and updated.      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 pylori negative(was done previously on the stool and also came negative)      Past Medical History:   Diagnosis Date    Bilateral edema of lower extremity 1995    Bilateral hearing loss     wears hearing aids    Diverticulosis of sigmoid colon     Gout     History of bladder cancer 1980    Dr. Kunal Chandra yearly cysto    History of colon polyps 09/10/2012    History of hypokalemia     History of melanoma 2008    left cheek    History of prostate cancer 1992    S/P Prostatectomy    Hyperlipidemia     Hypertension     Tubular adenoma of colon 09/10/2012       Past Surgical History:   Procedure Laterality Date    COLONOSCOPY  2005    dr Oleg Mendez    COLONOSCOPY  09/10/2012    significant diverticulosis, tubular adenoma-sigmoid colon, small hemorrhoids    COLONOSCOPY  10/03/2016    significant diverticulosis sigmoid colon, small polyp rectum-hyperplastic    EYE SURGERY Bilateral 1994    cataracts removed    HERNIA REPAIR Right 1956    inguinal    HERNIA REPAIR Left 1995    inguinal    JOINT REPLACEMENT Right 1984    great toe joint D/T gout    KNEE ARTHROPLASTY Left 2000    meniscal repair    PROSTATE SURGERY  2002    UPPER GASTROINTESTINAL ENDOSCOPY N/A 10/14/2022    EGD BIOPSY performed by Rj Warner MD at [facility] END[facility]       Allergies   Allergen Reactions    Mirabegron      Elevated blood pressure       Current Outpatient Medications   Medication Sig Dispense Refill    latanoprost (XALATAN) 0.005 % ophthalmic solution INSTILL 1 DROP INTO EACH EYE NIGHTLY 3 each 1    amLODIPine (NORVASC) 5 MG tablet TAKE 1 TABLET BY MOUTH ONCE DAILY 90 tablet 1    atorvastatin (LIPITOR) 20 MG tablet Take 1 tablet by mouth once daily 90 tablet 0    gabapentin (NEURONTIN) 100 MG capsule Take 1 capsule at night 90 capsule 1    hydroCHLOROthiazide (HYDRODIURIL) 25 MG tablet Take 1 tablet by mouth daily 90 tablet 1    lisinopril (PRINIVIL;ZESTRIL) 40 MG tablet Take 1 tablet by mouth daily 90 tablet 1    potassium chloride (KLOR-CON M) 20 MEQ extended release tablet Take 1 tablet by mouth daily 90 tablet 1    allopurinol (ZYLOPRIM) 300 MG tablet Take 1 tablet by mouth in the morning. 90 tablet 1    apixaban (ELIQUIS) 5 MG TABS tablet Take 5 mg by mouth 2 times daily      furosemide (LASIX) 20 MG tablet Take 20 mg by mouth daily as needed for Other      timolol (TIMOPTIC) 0.5 % ophthalmic solution Place 1 drop into the left eye daily 3 each 1    olopatadine (PATANASE) 0.6 % SOLN nassl soln SPRAY 2 SPRAY(S) IN EACH NOSTRIL NIGHTLY 3 each 1    pantoprazole (PROTONIX) 40 MG tablet Take 1 tablet by mouth daily Take 1 tablet by mouth daily 90 tablet 1    magnesium oxide (MAG-OX) 400 MG tablet Take 1 tablet by mouth daily 90 tablet 1    enoxaparin Sodium (LOVENOX) 30 MG/0.3ML injection Inject 30 mg into the skin in the morning and at bedtime (Patient not taking: Reported on 10/25/2022)       No current facility-administered medications for this visit. Social History     Socioeconomic History    Marital status:      Spouse name: Not on file    Number of children: Not on file    Years of education: Not on file    Highest education level: Not on file   Occupational History    Not on file   Tobacco Use    Smoking status: Never    Smokeless tobacco: Never   Vaping Use    Vaping Use: Never used   Substance and Sexual Activity    Alcohol use:  Yes     Alcohol/week: 10.0 standard drinks     Types: 10 Shots of liquor per week     Comment: 1-2 cocktails per week    Drug use: No    Sexual activity: Not on file   Other Topics Concern    Not on file   Social History Narrative    Not on file     Social Determinants of Health     Financial Resource Strain: Not on file   Food Insecurity: Not on file   Transportation Needs: Not on file   Physical Activity: Insufficiently Active    Days of Exercise per Week: 5 days    Minutes of Exercise per Session: 10 min   Stress: Not on file   Social Connections: Not on file   Intimate Partner Violence: Not on file   Housing Stability: Not on file       Family History   Problem Relation Age of Onset    Heart Attack Mother     Heart Attack Father         REVIEW OF SYSTEM:     Constitutional: No fever or chills. No night sweats, no weight loss   Eyes: No eye discharge, double vision, or eye pain   HEENT: negative for sore mouth, sore throat, hoarseness and voice change   Respiratory: negative for cough , sputum, dyspnea, wheezing, hemoptysis, chest pain   Cardiovascular: negative for chest pain, dyspnea, palpitations, orthopnea, PND   Gastrointestinal: negative for nausea, vomiting, diarrhea, constipation, abdominal pain, Dysphagia, hematemesis and hematochezia   Genitourinary: negative for frequency, dysuria, nocturia, urinary incontinence, and hematuria   Integument: negative for rash, skin lesions, bruises.    Hematologic/Lymphatic: negative for easy bruising, bleeding, lymphadenopathy, petechiae and swelling/edema   Endocrine: negative for heat or cold intolerance, tremor, weight changes, change in bowel habits and hair loss   Musculoskeletal: negative for myalgias, arthralgias, pain, joint swelling,and bone pain   Neurological: negative for headaches, dizziness, seizures, weakness, numbness       OBJECTIVE:         Vitals:    10/25/22 1128   BP: 120/77   Pulse: 73   Temp: 97.6 °F (36.4 °C)       PHYSICAL EXAM:   General appearance - well appearing, no in pain or distress   Mental status - alert and cooperative   Eyes - pupils equal and reactive, extraocular eye movements intact   Ears - bilateral TM's and external ear canals normal   Mouth - mucous membranes moist, pharynx normal without lesions   Neck - supple, no significant adenopathy   Lymphatics - no palpable lymphadenopathy, no hepatosplenomegaly   Chest - clear to auscultation, no wheezes, rales or rhonchi, symmetric air entry   Heart - normal rate, regular rhythm, normal S1, S2, no murmurs, rubs, clicks or gallops   Abdomen - soft, nontender, nondistended, no masses or organomegaly   Neurological - alert, oriented, normal speech, no focal findings or movement disorder noted   Musculoskeletal - no joint tenderness, deformity or swelling   Extremities - peripheral pulses normal, no pedal edema, no clubbing or cyanosis   Skin - normal coloration and turgor, no rashes, no suspicious skin lesions noted ,      LABORATORY DATA:     Lab Results   Component Value Date    WBC 9.1 07/20/2022    HGB 13.8 07/20/2022    HCT 42.5 07/20/2022    MCV 96.6 07/20/2022     07/20/2022    LYMPHOPCT 14 (L) 07/20/2022    RBC 4.40 07/20/2022    MCH 31.4 07/20/2022    MCHC 32.5 07/20/2022    RDW 13.5 07/20/2022    NEUTOPHILPCT 72.9 07/23/2020    MONOPCT 10 07/20/2022    EOSPCT 0.2 07/23/2020    BASOPCT 1 07/20/2022    NEUTROABS 6.46 07/20/2022    LYMPHSABS 1.29 07/20/2022    MONOSABS 0.92 07/20/2022    EOSABS 0.27 07/20/2022    BASOSABS 0.07 07/20/2022         Chemistry        Component Value Date/Time     07/20/2022 0846    K 4.0 07/20/2022 0846     07/20/2022 0846    CO2 26 07/20/2022 0846    BUN 25 (H) 07/20/2022 0846    CREATININE 1.40 (H) 07/20/2022 0846        Component Value Date/Time    CALCIUM 9.1 07/20/2022 0846    ALKPHOS 141 (H) 07/20/2022 0846    AST 21 07/20/2022 0846    ALT 14 07/20/2022 0846    BILITOT 1.03 07/20/2022 0846            PATHOLOGY DATA:         IMAGING DATA:      PET  FULL BODY  Narrative: EXAMINATION:  WHOLE BODY PET/CT WITH LOWER EXTREMITIES. 4/25/2022    TECHNIQUE:  Following IV injection of 11.3 mCi of F 18 -FDG, PET  tumor imaging was  acquired from the top of the skull to the mid thighs. Then, a separate  acquisition of the lower extremities from mid thigh to the tip of the toes  was acquired.   Computed tomography was used for purposes of attenuation  correction and anatomic localization. Fusion imaging was utilized for  interpretation. Body uptake time 56 mins. Glucose level 96 mg/dl. COMPARISON:  None    HISTORY:  ORDERING SYSTEM PROVIDED HISTORY: Extranodal marginal zone lymphoma of  mucosa-associated lymphoid tissue (MALT) of stomach (HCC)  TECHNOLOGIST PROVIDED HISTORY:  Reason for Exam: Extranodal marginal zone lymphoma of mucosa-associated  lymphoid tissue (MALT) of stomach (HCC) C88.4 (ICD-10-CM)    FINDINGS:  HEAD/NECK: No abnormal metabolic activity. No identifiable lymph nodes. CHEST: No abnormal metabolic activity. ABDOMEN/PELVIS: No abnormal metabolic activity identified. Diffuse low level  activity is present throughout the stomach and small bowel without CT  abnormality. No lymphadenopathy. Normal size spleen without abnormal  metabolic activity. BONES/SOFT TISSUE: No suspicious metabolic activity identified. Scattered  areas of nonspecific low level activity in the paraspinal soft tissues  shoulders and about the left knee arthroplasty without CT abnormality  appreciated. INCIDENTAL CT FINDINGS: Calcified atheromatous plaque and coronary  calcification. Sequela of old granulomatous disease. Anterolateral left  pleural lipoma. Diverticulosis. Status post prostatectomy. Impression: No suspicious metabolic activity or lymphadenopathy identified. ASSESSMENT:       Diagnosis Orders   1.  MALT lymphoma ([facility] [address]  CBC with Auto Differential    Comprehensive Metabolic Panel    PET CT SKULL BASE TO MID THIGH             Gastric MALT lymphoma clinical stage I status post 4 treatments of Rituxan declined radiation  Multiple comorbidity  Remote history of bladder cancer prostate cancer and recently angiosarcoma of the right cheek surgery done at [facility] with no evidence of recurrence  Patient does not have congestive heart failure and ejection fraction 70% but diastolic dysfunction  High creatinine and total bilirubin  New onset abdominal pain    PLAN:     Reviewed lab work, imaging studies, outside records and discuss possible diagnosis and treatment recommendations  Patient had stage I gastric MALT lymphoma based on the imaging with no lymphadenopathy, this is indolent lymphoma and the standard of care would be observation/surveillance(for stage II or above) unless had symptoms or indication for treatment but with stage I there is a option of cure with ISRT(local radiation) or rituximab if ISRT is contraindicated, patient met with radiation oncology and did decide not to proceed with radiation treatment,  Patient agreed for Rituxan given 1 week over 4 times which started on June 29, 2022  Patient tolerated treatment and done with treatment  posttreatment endoscopy still showing persistent low-grade lymphoma however patient had no symptoms and is still refusing radiation discussed with him about proceeding with surveillance for low-grade lymphoma we will obtain a PET/CT in 4 months,   For the epigastric pain I think it is likely acid reflux continue Protonix and endoscopy as above  With history of similar cancer may be eligible for genetic study  RTC in 4 months with repeated PET/CT                   NCCN guidelines were reviewed and discussed with the patient. The diagnosis and care plan were discussed with the patient in detail. I discussed the natural history of the disease, prognosis, risks and goals of therapy and answered all the patients questions to the best of my ability. Patient expressed understanding and was in agreement.       Thank you for the consult we will follow the patient with you                                                [facility]                            This note is created with the assistance of a speech recognition program.  While intending to generate a document that actually reflects the content of the visit, the document can still have some errors including those of syntax and sound a like substitutions which may escape proof reading. It such instances, actual meaning can be extrapolated by contextual diversion.

## 2022-11-21 ENCOUNTER — TELEPHONE (OUTPATIENT)
Dept: ONCOLOGY | Age: 87
End: 2022-11-21

## 2023-02-07 ENCOUNTER — HOSPITAL ENCOUNTER (OUTPATIENT)
Dept: NUCLEAR MEDICINE | Age: 88
Discharge: HOME | End: 2023-02-09
Payer: COMMERCIAL

## 2023-02-07 DIAGNOSIS — C88.4 MALT LYMPHOMA (HCC): ICD-10-CM

## 2023-02-07 LAB — GLUCOSE BLD-MCNC: 105 MG/DL (ref 75–110)

## 2023-02-07 PROCEDURE — 2580000003 HC RX 258: Performed by: INTERNAL MEDICINE

## 2023-02-07 PROCEDURE — A9552 F18 FDG: HCPCS | Performed by: INTERNAL MEDICINE

## 2023-02-07 PROCEDURE — 78815 PET IMAGE W/CT SKULL-THIGH: CPT

## 2023-02-07 PROCEDURE — 82947 ASSAY GLUCOSE BLOOD QUANT: CPT

## 2023-02-07 PROCEDURE — 3430000000 HC RX DIAGNOSTIC RADIOPHARMACEUTICAL: Performed by: INTERNAL MEDICINE

## 2023-02-07 RX ORDER — FLUDEOXYGLUCOSE F 18 200 MCI/ML
10 INJECTION, SOLUTION INTRAVENOUS
Status: COMPLETED | OUTPATIENT
Start: 2023-02-07 | End: 2023-02-07

## 2023-02-07 RX ORDER — SODIUM CHLORIDE 0.9 % (FLUSH) 0.9 %
10 SYRINGE (ML) INJECTION PRN
Status: DISCONTINUED | OUTPATIENT
Start: 2023-02-07 | End: 2023-02-10 | Stop reason: HOSPADM

## 2023-02-07 RX ADMIN — FLUDEOXYGLUCOSE F 18 7.62 MILLICURIE: 200 INJECTION, SOLUTION INTRAVENOUS at 10:32

## 2023-02-07 RX ADMIN — SODIUM CHLORIDE, PRESERVATIVE FREE 10 ML: 5 INJECTION INTRAVENOUS at 10:32

## 2023-02-17 ENCOUNTER — OFFICE VISIT (OUTPATIENT)
Dept: ONCOLOGY | Age: 88
End: 2023-02-17
Payer: COMMERCIAL

## 2023-02-17 ENCOUNTER — TELEPHONE (OUTPATIENT)
Dept: ONCOLOGY | Age: 88
End: 2023-02-17

## 2023-02-17 VITALS
WEIGHT: 179 LBS | TEMPERATURE: 97.1 F | BODY MASS INDEX: 27.22 KG/M2 | SYSTOLIC BLOOD PRESSURE: 105 MMHG | DIASTOLIC BLOOD PRESSURE: 70 MMHG | HEART RATE: 53 BPM

## 2023-02-17 DIAGNOSIS — C88.4 MALT LYMPHOMA (HCC): Primary | ICD-10-CM

## 2023-02-17 PROCEDURE — 99214 OFFICE O/P EST MOD 30 MIN: CPT | Performed by: INTERNAL MEDICINE

## 2023-02-17 PROCEDURE — 1123F ACP DISCUSS/DSCN MKR DOCD: CPT | Performed by: INTERNAL MEDICINE

## 2023-02-17 NOTE — PROGRESS NOTES
Patient ID: Nicolette Schmitz, 9/3/1933, [id number], 80 y.o.   Referred by :  Ora Dakin, MD    Reason for consultation: Gastric marginal zone lymphoma      HISTORY OF PRESENT ILLNESS:    Oncologic History:    Nicolette Schmitz is a very pleasant 80 y.o. 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 [facility] with no radiation  Imaging was done including CT abdomen pelvis and MRI and no evidence of metastasis/cancer/lymphadenopathy  PET/CT and no evidence of lymphoma activity elsewhere other than stomach  There was diffuse low-level activity throughout the stomach and the small bowel without CT abnormality  Case discussed at the tumor board  Patient visited with radiation oncology and he decided not to get radiation and decided to start Rituxan weekly for 4 weeks which started on 6/29/2022 and given for 4 times tolerated treatment well  Endoscopy was repeated on 10/14/2022> Prominent fold in the upper part of the stomach towards the fundus towards the greater curvature and pathology 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 pylori negative(was done previously on the stool and also came negative)        Interim history  Patient presents to the clinic for a follow-up visit and to discuss results of his lab work-up and other relevant clinical data. Overall patient has been tolerating treatment without unexpected or severe side effects. During this visit patient's allergy, social, medical, surgical history and medications were reviewed and updated.      Patient had no symptoms no epigastric pain other than typical weakness  PET/CT was done on February 7, 2023 and no evidence of recurrence  WBC in normal limits      Past Medical History:   Diagnosis Date    Bilateral edema of lower extremity 1995    Bilateral hearing loss     wears hearing aids    Diverticulosis of sigmoid colon     Gout     History of bladder cancer 1980    Dr. Jermaine Hairston yearly cysto    History of colon polyps 09/10/2012    History of hypokalemia     History of melanoma 2008    left cheek    History of prostate cancer 1992    S/P Prostatectomy    Hyperlipidemia     Hypertension     Tubular adenoma of colon 09/10/2012       Past Surgical History:   Procedure Laterality Date    COLONOSCOPY  2005    dr Joycelyn Moreno    COLONOSCOPY  09/10/2012    significant diverticulosis, tubular adenoma-sigmoid colon, small hemorrhoids    COLONOSCOPY  10/03/2016    significant diverticulosis sigmoid colon, small polyp rectum-hyperplastic    EYE SURGERY Bilateral 1994    cataracts removed    HERNIA REPAIR Right 1956    inguinal    HERNIA REPAIR Left 1995    inguinal    JOINT REPLACEMENT Right 1984    great toe joint D/T gout    KNEE ARTHROPLASTY Left 2000    meniscal repair    PROSTATE SURGERY  2002    UPPER GASTROINTESTINAL ENDOSCOPY N/A 10/14/2022    EGD BIOPSY performed by Adi Walter MD at [facility]       Allergies   Allergen Reactions    Mirabegron      Elevated blood pressure       Current Outpatient Medications   Medication Sig Dispense Refill    atorvastatin (LIPITOR) 20 MG tablet Take 1 tablet by mouth once daily 90 tablet 1    olopatadine (PATANASE) 0.6 % SOLN nassl soln SPRAY 2 SPRAY(S) IN EACH NOSTRIL NIGHTLY 93 g 0 pantoprazole (PROTONIX) 40 MG tablet Take 1 tablet by mouth daily Take 1 tablet by mouth daily 90 tablet 1    magnesium oxide (MAG-OX) 400 MG tablet Take 1 tablet by mouth daily 90 tablet 1    latanoprost (XALATAN) 0.005 % ophthalmic solution INSTILL 1 DROP INTO EACH EYE NIGHTLY 3 each 1    amLODIPine (NORVASC) 5 MG tablet TAKE 1 TABLET BY MOUTH ONCE DAILY 90 tablet 1    gabapentin (NEURONTIN) 100 MG capsule Take 1 capsule at night 90 capsule 1    hydroCHLOROthiazide (HYDRODIURIL) 25 MG tablet Take 1 tablet by mouth daily 90 tablet 1    lisinopril (PRINIVIL;ZESTRIL) 40 MG tablet Take 1 tablet by mouth daily 90 tablet 1    potassium chloride (KLOR-CON M) 20 MEQ extended release tablet Take 1 tablet by mouth daily 90 tablet 1    allopurinol (ZYLOPRIM) 300 MG tablet Take 1 tablet by mouth in the morning. 90 tablet 1    apixaban (ELIQUIS) 5 MG TABS tablet Take 5 mg by mouth 2 times daily      furosemide (LASIX) 20 MG tablet Take 20 mg by mouth daily as needed for Other      timolol (TIMOPTIC) 0.5 % ophthalmic solution Place 1 drop into the left eye daily 3 each 1    enoxaparin Sodium (LOVENOX) 30 MG/0.3ML injection Inject 30 mg into the skin in the morning and at bedtime (Patient not taking: No sig reported)       No current facility-administered medications for this visit. Social History     Socioeconomic History    Marital status:      Spouse name: Not on file    Number of children: Not on file    Years of education: Not on file    Highest education level: Not on file   Occupational History    Not on file   Tobacco Use    Smoking status: Never    Smokeless tobacco: Never   Vaping Use    Vaping Use: Never used   Substance and Sexual Activity    Alcohol use:  Yes     Alcohol/week: 10.0 standard drinks     Types: 10 Shots of liquor per week     Comment: 1-2 cocktails per week    Drug use: No    Sexual activity: Not on file   Other Topics Concern    Not on file   Social History Narrative    Not on file Social Determinants of Health     Financial Resource Strain: Not on file   Food Insecurity: Not on file   Transportation Needs: Not on file   Physical Activity: Insufficiently Active    Days of Exercise per Week: 5 days    Minutes of Exercise per Session: 10 min   Stress: Not on file   Social Connections: Not on file   Intimate Partner Violence: Not on file   Housing Stability: Not on file       Family History   Problem Relation Age of Onset    Heart Attack Mother     Heart Attack Father         REVIEW OF SYSTEM:     Constitutional: No fever or chills. No night sweats, no weight loss   Eyes: No eye discharge, double vision, or eye pain   HEENT: negative for sore mouth, sore throat, hoarseness and voice change   Respiratory: negative for cough , sputum, dyspnea, wheezing, hemoptysis, chest pain   Cardiovascular: negative for chest pain, dyspnea, palpitations, orthopnea, PND   Gastrointestinal: negative for nausea, vomiting, diarrhea, constipation, abdominal pain, Dysphagia, hematemesis and hematochezia   Genitourinary: negative for frequency, dysuria, nocturia, urinary incontinence, and hematuria   Integument: negative for rash, skin lesions, bruises.    Hematologic/Lymphatic: negative for easy bruising, bleeding, lymphadenopathy, petechiae and swelling/edema   Endocrine: negative for heat or cold intolerance, tremor, weight changes, change in bowel habits and hair loss   Musculoskeletal: negative for myalgias, arthralgias, pain, joint swelling,and bone pain   Neurological: negative for headaches, dizziness, seizures, weakness, numbness       OBJECTIVE:         Vitals:    02/17/23 1045   BP: 105/70   Pulse: 53   Temp: 97.1 °F (36.2 °C)       PHYSICAL EXAM:   General appearance - well appearing, no in pain or distress   Mental status - alert and cooperative   Eyes - pupils equal and reactive, extraocular eye movements intact   Ears - bilateral TM's and external ear canals normal   Mouth - mucous membranes moist, pharynx normal without lesions   Neck - supple, no significant adenopathy   Lymphatics - no palpable lymphadenopathy, no hepatosplenomegaly   Chest - clear to auscultation, no wheezes, rales or rhonchi, symmetric air entry   Heart - normal rate, regular rhythm, normal S1, S2, no murmurs, rubs, clicks or gallops   Abdomen - soft, nontender, nondistended, no masses or organomegaly   Neurological - alert, oriented, normal speech, no focal findings or movement disorder noted   Musculoskeletal - no joint tenderness, deformity or swelling   Extremities - peripheral pulses normal, no pedal edema, no clubbing or cyanosis   Skin - normal coloration and turgor, no rashes, no suspicious skin lesions noted ,      LABORATORY DATA:     Lab Results   Component Value Date    WBC 9.1 07/20/2022    HGB 13.8 07/20/2022    HCT 42.5 07/20/2022    MCV 96.6 07/20/2022     07/20/2022    LYMPHOPCT 14 (L) 07/20/2022    RBC 4.40 07/20/2022    MCH 31.4 07/20/2022    MCHC 32.5 07/20/2022    RDW 13.5 07/20/2022    NEUTOPHILPCT 72.9 07/23/2020    MONOPCT 10 07/20/2022    EOSPCT 0.2 07/23/2020    BASOPCT 1 07/20/2022    NEUTROABS 6.46 07/20/2022    LYMPHSABS 1.29 07/20/2022    MONOSABS 0.92 07/20/2022    EOSABS 0.27 07/20/2022    BASOSABS 0.07 07/20/2022         Chemistry        Component Value Date/Time     07/20/2022 0846    K 4.0 07/20/2022 0846     07/20/2022 0846    CO2 26 07/20/2022 0846    BUN 25 (H) 07/20/2022 0846    CREATININE 1.40 (H) 07/20/2022 0846        Component Value Date/Time    CALCIUM 9.1 07/20/2022 0846    ALKPHOS 141 (H) 07/20/2022 0846    AST 21 07/20/2022 0846    ALT 14 07/20/2022 0846    BILITOT 1.03 07/20/2022 0846            PATHOLOGY DATA:         IMAGING DATA:      PET CT SKULL BASE TO MID THIGH  Narrative: EXAMINATION:  Skull base to midthigh PET/CT    2/7/2023    TECHNIQUE:  Following IV injection of 7.62 mCi of F-18 FDG, PET  tumor imaging was  acquired from the base of the skull to the mid thighs. Computed tomography  was used for purposes of attenuation correction and anatomic localization. Fusion imaging was utilized for interpretation. Uptake time 51 min. Glucose level 106 mg/dl. COMPARISON:  04/25/2022    HISTORY:  ORDERING SYSTEM PROVIDED HISTORY: MALT lymphoma ([facility] [address]  TECHNOLOGIST PROVIDED HISTORY:  Surveillance for lymphoma  Reason for Exam: Surveillance for MALT lymphoma    FINDINGS:  HEAD/NECK: In the craniocervical soft tissues, activity localizing to fat  likely reflects brown fat. CHEST: Extensive bilateral and relatively symmetric activity is seen  localizing to fat in bilateral paraspinal regions, compatible with brown fat. Calcified mediastinal and right hilar lymph nodes, in keeping with  granulomatous disease. Calcification of the thoracic aorta. Coronary artery  calcification. Within the mediastinum, no leni activity markedly greater  than mediastinal background. No hypermetabolic axillary adenopathy. 9 mm  subcutaneous nodule at the anterior left chest, not markedly FDG avid,  potentially sebaceous cyst.    Prominent extrapleural fat is seen with impression on the left upper lobe,  with adjacent atelectasis. No pneumothorax or pleural effusion. Patchy  atelectasis. ABDOMEN/PELVIS: Excretion of activity is seen from the kidneys and activity  is seen within ureter and urinary bladder. Bowel activity is seen which can  be physiologically variable. Calcified granulomas within the liver and spleen. Bilateral renal cysts and  renal cortical scarring. 2.4 cm hyperdense lesion is seen within the mid  right kidney, indeterminate by CT criteria though not markedly FDG avid. Calcification of the abdominal aorta and iliac arteries. Status post  prostatectomy. Clips are seen within the pelvis. Impression: No hypermetabolic adenopathy with regard to the history of lymphoma.     Metabolically active brown fat is incidentally demonstrated within the neck  and chest.    RECOMMENDATIONS:  Unavailable       ASSESSMENT:       Diagnosis Orders   1. MALT lymphoma ([facility] [address]               Gastric MALT lymphoma clinical stage I status post 4 treatments of Rituxan declined radiation  Multiple comorbidity  Remote history of bladder cancer prostate cancer and recently angiosarcoma of the right cheek surgery done at [address] [place] [address],[address] with no evidence of recurrence  diastolic dysfunction  High creatinine> chronic kidney disease  Fatigue  Intermittent epigastric pain likely acid reflux      PLAN:     Reviewed lab work, imaging studies, outside records and discuss possible diagnosis and treatment recommendations  Patient had stage I gastric MALT lymphoma based on the imaging with no lymphadenopathy, this is indolent lymphoma and the standard of care would be observation/surveillance(for stage II or above) unless had symptoms or indication for treatment but with stage I there is a option of cure with ISRT(local radiation) or rituximab if ISRT is contraindicated, patient met with radiation oncology and did decide not to proceed with radiation treatment,  Patient agreed for Rituxan started on June 29, 2022 and status post 4 treatments and tolerated treatment well  posttreatment endoscopy still showing persistent low-grade lymphoma however patient had no symptoms and he declined radiation  Patient doing well and repeated PET/CT no evidence of recurrence and CBC in the normal limit  We will proceed with surveillance with history and physical and CBC every 6 months and imaging/endoscopy will be done only if needed  surveillance for low-grade lymphoma we will obtain a PET/CT in 4 months,   For the epigastric pain that is improving I think it is likely acid reflux continue Protonix   With history of similar cancer may be eligible for genetic study    RTC in 6 months with labs                     NCCN guidelines were reviewed and discussed with the patient.   The diagnosis and care plan were discussed with the patient in detail. I discussed the natural history of the disease, prognosis, risks and goals of therapy and answered all the patients questions to the best of my ability. Patient expressed understanding and was in agreement. Thank you for the consult we will follow the patient with you                                                [facility]                            This note is created with the assistance of a speech recognition program.  While intending to generate a document that actually reflects the content of the visit, the document can still have some errors including those of syntax and sound a like substitutions which may escape proof reading. It such instances, actual meaning can be extrapolated by contextual diversion.

## 2023-02-23 ENCOUNTER — TELEPHONE (OUTPATIENT)
Dept: ONCOLOGY | Age: 88
End: 2023-02-23

## 2023-02-23 NOTE — TELEPHONE ENCOUNTER
Name: Murali Munoz  : 9/3/1933  MRN: [id number]    Oncology Navigation Follow-Up Note    Contact Type:  Telephone    Notes: Per Dr. Conde's  f/u note recent PET/CT scan shows no evidence of recurrence & pt to f/u in 6 months.  Spoke with pt's spouse, Gabriella, for f/u call.  Gabriella denied questions/concerns.  Notified Gabriella navigation ended & may contact writer prn.      Electronically signed by Rebeca Montague RN on 2023 at 9:39 AM    Admission Reconciliation is Completed  Discharge Reconciliation is Completed

## 2023-08-22 ENCOUNTER — TELEPHONE (OUTPATIENT)
Dept: ONCOLOGY | Age: 88
End: 2023-08-22

## 2023-08-22 ENCOUNTER — OFFICE VISIT (OUTPATIENT)
Dept: ONCOLOGY | Age: 88
End: 2023-08-22
Payer: COMMERCIAL

## 2023-08-22 VITALS
TEMPERATURE: 96.9 F | SYSTOLIC BLOOD PRESSURE: 120 MMHG | BODY MASS INDEX: 27.25 KG/M2 | DIASTOLIC BLOOD PRESSURE: 67 MMHG | HEART RATE: 47 BPM | WEIGHT: 179.2 LBS

## 2023-08-22 DIAGNOSIS — C88.4 MALT LYMPHOMA (HCC): Primary | ICD-10-CM

## 2023-08-22 DIAGNOSIS — Z85.51 HISTORY OF BLADDER CANCER: ICD-10-CM

## 2023-08-22 DIAGNOSIS — Z85.46 HISTORY OF PROSTATE CANCER: ICD-10-CM

## 2023-08-22 DIAGNOSIS — Z85.820 HISTORY OF MELANOMA: ICD-10-CM

## 2023-08-22 PROCEDURE — 1036F TOBACCO NON-USER: CPT | Performed by: INTERNAL MEDICINE

## 2023-08-22 PROCEDURE — 99211 OFF/OP EST MAY X REQ PHY/QHP: CPT

## 2023-08-22 PROCEDURE — 1123F ACP DISCUSS/DSCN MKR DOCD: CPT | Performed by: INTERNAL MEDICINE

## 2023-08-22 PROCEDURE — G8417 CALC BMI ABV UP PARAM F/U: HCPCS | Performed by: INTERNAL MEDICINE

## 2023-08-22 PROCEDURE — G8427 DOCREV CUR MEDS BY ELIG CLIN: HCPCS | Performed by: INTERNAL MEDICINE

## 2023-08-22 PROCEDURE — 99214 OFFICE O/P EST MOD 30 MIN: CPT | Performed by: INTERNAL MEDICINE

## 2023-08-22 NOTE — PROGRESS NOTES
Patient ID: Alfreda Infante, 9/3/1933, [id number], 80 y.o.   Referred by :  Terry Maher MD    Reason for consultation: Gastric marginal zone lymphoma      HISTORY OF PRESENT ILLNESS:    Oncologic History:    Alfreda Infante is a very pleasant 80 y.o. 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 [facility] with no radiation  Imaging was done including CT abdomen pelvis and MRI and no evidence of metastasis/cancer/lymphadenopathy  PET/CT and no evidence of lymphoma activity elsewhere other than stomach  There was diffuse low-level activity throughout the stomach and the small bowel without CT abnormality  Case discussed at the tumor board  Patient visited with radiation oncology and he decided not to get radiation and decided to start Rituxan weekly for 4 weeks which started on 6/29/2022 and given for 4 times tolerated treatment well  Endoscopy was repeated on 10/14/2022> Prominent fold in the upper part of the stomach towards the fundus towards the greater curvature and pathology 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 pylori negative(was done previously on the stool and also came negative)  PET/CT was done on February 7, 2023 and no evidence of recurrence  WBC in normal

## 2023-08-22 NOTE — TELEPHONE ENCOUNTER
Avs from 8/22/23    RTC in 6 months with labs            Labs ordered today    CBC with Auto Differential  Please complete by or around 8/29/2023  Comprehensive Metabolic Panel  Please complete by or around 8/29/2023  PSA, Diagnostic  12 remaining until 8/21/2024      Rv on 2/27/24    Labs drawn week prior     PT was given AVS and appt schedule    Electronically signed by [facility] on 8/22/2023 at 9:51 AM

## 2024-02-19 DIAGNOSIS — Z85.46 HISTORY OF PROSTATE CANCER: Primary | ICD-10-CM

## 2024-02-27 ENCOUNTER — OFFICE VISIT (OUTPATIENT)
Dept: ONCOLOGY | Age: 89
End: 2024-02-27
Payer: COMMERCIAL

## 2024-02-27 ENCOUNTER — TELEPHONE (OUTPATIENT)
Dept: ONCOLOGY | Age: 89
End: 2024-02-27

## 2024-02-27 VITALS
BODY MASS INDEX: 26.46 KG/M2 | WEIGHT: 174 LBS | HEART RATE: 54 BPM | DIASTOLIC BLOOD PRESSURE: 61 MMHG | TEMPERATURE: 97 F | SYSTOLIC BLOOD PRESSURE: 99 MMHG

## 2024-02-27 DIAGNOSIS — C88.4 MALT LYMPHOMA (HCC): ICD-10-CM

## 2024-02-27 DIAGNOSIS — Z85.46 HISTORY OF PROSTATE CANCER: ICD-10-CM

## 2024-02-27 DIAGNOSIS — Z85.820 HISTORY OF MELANOMA: ICD-10-CM

## 2024-02-27 DIAGNOSIS — R59.0 AXILLARY LYMPHADENOPATHY: Primary | ICD-10-CM

## 2024-02-27 DIAGNOSIS — Z85.51 HISTORY OF BLADDER CANCER: ICD-10-CM

## 2024-02-27 PROCEDURE — 99214 OFFICE O/P EST MOD 30 MIN: CPT | Performed by: INTERNAL MEDICINE

## 2024-02-27 PROCEDURE — 1123F ACP DISCUSS/DSCN MKR DOCD: CPT | Performed by: INTERNAL MEDICINE

## 2024-02-27 PROCEDURE — 99211 OFF/OP EST MAY X REQ PHY/QHP: CPT

## 2024-02-27 NOTE — PROGRESS NOTES
Patient ID: Murali Munoz, 9/3/1933, [id number], 89 y.o.  Referred by :  Rehan Thomas MD    Reason for consultation: Gastric marginal zone lymphoma      HISTORY OF PRESENT ILLNESS:    Oncologic History:    Murali Munoz is a very pleasant 89 y.o. 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 [facility] with no radiation  Imaging was done including CT abdomen pelvis and MRI and no evidence of metastasis/cancer/lymphadenopathy  PET/CT and no evidence of lymphoma activity elsewhere other than stomach  There was diffuse low-level activity throughout the stomach and the small bowel without CT abnormality  Case discussed at the tumor board  Patient visited with radiation oncology and he decided not to get radiation and decided to start Rituxan weekly for 4 weeks which started on 6/29/2022 and given for 4 times tolerated treatment well  Endoscopy was repeated on 10/14/2022> Prominent fold in the upper part of the stomach towards the fundus towards the greater curvature and pathology 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 pylori negative(was done previously on the stool and also came negative)  PET/CT was done on February 7, 2023 and no evidence of recurrence  WBC in normal

## 2024-02-27 NOTE — TELEPHONE ENCOUNTER
AVS 02/27/24    Rtc in 6 months with labs    Labs ordered today  CBC with Auto Differential  Please complete by or around 3/5/2024  Comprehensive Metabolic Panel  Please complete by or around 3/5/2024    RV 08/27/24    Labs to be done week prior.    PT was given AVS and appt schedule    Electronically signed by Selene Narayan on 2/27/2024 at 9:32 AM

## 2024-03-01 ENCOUNTER — HOSPITAL ENCOUNTER (OUTPATIENT)
Age: 89
Discharge: HOME | End: 2024-03-01
Payer: COMMERCIAL

## 2024-03-01 DIAGNOSIS — C88.4 MALT LYMPHOMA (HCC): ICD-10-CM

## 2024-03-01 DIAGNOSIS — R59.0 AXILLARY LYMPHADENOPATHY: ICD-10-CM

## 2024-03-01 LAB
ALBUMIN SERPL-MCNC: 4 G/DL (ref 3.5–5.2)
ALP SERPL-CCNC: 197 U/L (ref 40–129)
ALT SERPL-CCNC: 15 U/L (ref 5–41)
ANION GAP SERPL CALCULATED.3IONS-SCNC: 11 MMOL/L (ref 9–17)
AST SERPL-CCNC: 25 U/L
BASOPHILS # BLD: 0 K/UL (ref 0–0.2)
BASOPHILS NFR BLD: 1 % (ref 0–2)
BILIRUB SERPL-MCNC: 1.1 MG/DL (ref 0.3–1.2)
BUN SERPL-MCNC: 22 MG/DL (ref 8–23)
CALCIUM SERPL-MCNC: 9.2 MG/DL (ref 8.6–10.4)
CHLORIDE SERPL-SCNC: 101 MMOL/L (ref 98–107)
CO2 SERPL-SCNC: 26 MMOL/L (ref 20–31)
CREAT SERPL-MCNC: 1.3 MG/DL (ref 0.7–1.2)
EOSINOPHIL # BLD: 0.2 K/UL (ref 0–0.4)
EOSINOPHILS RELATIVE PERCENT: 3 % (ref 0–4)
ERYTHROCYTE [DISTWIDTH] IN BLOOD BY AUTOMATED COUNT: 14.1 % (ref 11.5–14.9)
GFR SERPL CREATININE-BSD FRML MDRD: 52 ML/MIN/1.73M2
GLUCOSE SERPL-MCNC: 117 MG/DL (ref 70–99)
HCT VFR BLD AUTO: 39.5 % (ref 41–53)
HGB BLD-MCNC: 13 G/DL (ref 13.5–17.5)
LYMPHOCYTES NFR BLD: 1.1 K/UL (ref 1–4.8)
LYMPHOCYTES RELATIVE PERCENT: 14 % (ref 24–44)
MCH RBC QN AUTO: 32.3 PG (ref 26–34)
MCHC RBC AUTO-ENTMCNC: 32.8 G/DL (ref 31–37)
MCV RBC AUTO: 98.4 FL (ref 80–100)
MONOCYTES NFR BLD: 0.7 K/UL (ref 0.1–1.3)
MONOCYTES NFR BLD: 9 % (ref 1–7)
NEUTROPHILS NFR BLD: 73 % (ref 36–66)
NEUTS SEG NFR BLD: 5.9 K/UL (ref 1.3–9.1)
PLATELET # BLD AUTO: 179 K/UL (ref 150–450)
PMV BLD AUTO: 8.8 FL (ref 6–12)
POTASSIUM SERPL-SCNC: 3.8 MMOL/L (ref 3.7–5.3)
PROT SERPL-MCNC: 6.3 G/DL (ref 6.4–8.3)
RBC # BLD AUTO: 4.02 M/UL (ref 4.5–5.9)
SODIUM SERPL-SCNC: 138 MMOL/L (ref 135–144)
WBC OTHER # BLD: 8 K/UL (ref 3.5–11)

## 2024-03-01 PROCEDURE — 85025 COMPLETE CBC W/AUTO DIFF WBC: CPT

## 2024-03-01 PROCEDURE — 80053 COMPREHEN METABOLIC PANEL: CPT

## 2024-03-01 PROCEDURE — 36415 COLL VENOUS BLD VENIPUNCTURE: CPT

## 2024-06-06 ENCOUNTER — OFFICE VISIT (OUTPATIENT)
Age: 89
End: 2024-06-06
Payer: COMMERCIAL

## 2024-06-06 VITALS — WEIGHT: 160 LBS | BODY MASS INDEX: 24.25 KG/M2 | HEIGHT: 68 IN

## 2024-06-06 DIAGNOSIS — I73.9 PERIPHERAL ARTERIAL DISEASE (HCC): ICD-10-CM

## 2024-06-06 DIAGNOSIS — M79.675 PAIN OF TOES OF BOTH FEET: ICD-10-CM

## 2024-06-06 DIAGNOSIS — M79.674 PAIN OF TOES OF BOTH FEET: ICD-10-CM

## 2024-06-06 DIAGNOSIS — B35.1 ONYCHOMYCOSIS OF TOENAIL: Primary | ICD-10-CM

## 2024-06-06 PROCEDURE — 99203 OFFICE O/P NEW LOW 30 MIN: CPT | Performed by: PODIATRIST

## 2024-06-06 PROCEDURE — 1123F ACP DISCUSS/DSCN MKR DOCD: CPT | Performed by: PODIATRIST

## 2024-06-06 PROCEDURE — 11721 DEBRIDE NAIL 6 OR MORE: CPT | Performed by: PODIATRIST

## 2024-06-06 ASSESSMENT — ENCOUNTER SYMPTOMS
NAUSEA: 0
COLOR CHANGE: 0
BACK PAIN: 0
SHORTNESS OF BREATH: 0
DIARRHEA: 0

## 2024-06-06 NOTE — PROGRESS NOTES
Murali Munoz is a 89 y.o. male who presents to the office today with chief complaint of thick, painful nails to both feet.  Chief Complaint   Patient presents with    Nail Problem     B/l nail trim, last seen Olivia Paulino MD 6/5/24   Symptoms began about 1 year(s) ago. Patient denies injury to the feet. Patient states that his toenails are thick and are painful with shoe gear and ambulation. Pain is rated 5 out of 10 at it's worst and is described as intermittent. Treatments prior to today's visit include: None.      Allergies   Allergen Reactions    Mirabegron      Elevated blood pressure       Past Medical History:   Diagnosis Date    Bilateral edema of lower extremity 1995    Bilateral hearing loss     wears hearing aids    Diverticulosis of sigmoid colon     Gout     History of bladder cancer 1980    Dr. Valero/has yearly cysto    History of colon polyps 09/10/2012    History of hypokalemia     History of melanoma 2008    left cheek    History of prostate cancer 1992    S/P Prostatectomy    Hyperlipidemia     Hypertension     Tubular adenoma of colon 09/10/2012       Prior to Admission medications    Medication Sig Start Date End Date Taking? Authorizing Provider   cetirizine (ZYRTEC) 5 MG tablet Take 1 tablet by mouth daily as needed for Allergies 6/5/24  Yes Olivia Paulino MD   lisinopril (PRINIVIL;ZESTRIL) 40 MG tablet Take 1 tablet by mouth once daily 5/25/24  Yes Lisa Gonzalez APRN - CNP   allopurinol (ZYLOPRIM) 300 MG tablet Take 1 tablet by mouth daily 5/13/24  Yes Olivia Paulino MD   lidocaine (LIDODERM) 5 % Place 1 patch onto the skin daily 12 hours on, 12 hours off. 4/16/24  Yes lOivia Paulino MD   amLODIPine (NORVASC) 5 MG tablet Take 1 tablet by mouth daily 4/16/24  Yes Olivia Paulino MD   potassium chloride (KLOR-CON M) 20 MEQ extended release tablet Take 1 tablet by mouth daily 3/6/24  Yes Olivia Paulino MD   pantoprazole (PROTONIX) 40 MG tablet Take 1 tablet by mouth daily

## 2024-07-02 ENCOUNTER — HOSPITAL ENCOUNTER (OUTPATIENT)
Age: 89
Setting detail: SPECIMEN
Discharge: HOME | End: 2024-07-02

## 2024-07-02 LAB
ALBUMIN SERPL-MCNC: 3.3 G/DL (ref 3.5–5.2)
ALP SERPL-CCNC: 391 U/L (ref 40–129)
ALT SERPL-CCNC: 11 U/L (ref 10–50)
ANION GAP SERPL CALCULATED.3IONS-SCNC: 9 MMOL/L (ref 9–16)
AST SERPL-CCNC: 29 U/L (ref 10–50)
BILIRUB SERPL-MCNC: 0.9 MG/DL (ref 0–1.2)
BUN SERPL-MCNC: 23 MG/DL (ref 8–23)
CALCIUM SERPL-MCNC: 8.8 MG/DL (ref 8.6–10.4)
CHLORIDE SERPL-SCNC: 102 MMOL/L (ref 98–107)
CO2 SERPL-SCNC: 24 MMOL/L (ref 20–31)
CREAT SERPL-MCNC: 1 MG/DL (ref 0.7–1.2)
ERYTHROCYTE [DISTWIDTH] IN BLOOD BY AUTOMATED COUNT: 14.3 % (ref 11.8–14.4)
GFR, ESTIMATED: 71 ML/MIN/1.73M2
GLUCOSE SERPL-MCNC: 83 MG/DL (ref 74–99)
HCT VFR BLD AUTO: 36.6 % (ref 40.7–50.3)
HGB BLD-MCNC: 12.1 G/DL (ref 13–17)
MCH RBC QN AUTO: 31.8 PG (ref 25.2–33.5)
MCHC RBC AUTO-ENTMCNC: 33.1 G/DL (ref 28.4–34.8)
MCV RBC AUTO: 96.3 FL (ref 82.6–102.9)
NRBC BLD-RTO: 0 PER 100 WBC
PLATELET # BLD AUTO: 188 K/UL (ref 138–453)
PMV BLD AUTO: 11.1 FL (ref 8.1–13.5)
POTASSIUM SERPL-SCNC: 3.8 MMOL/L (ref 3.7–5.3)
PROT SERPL-MCNC: 5.5 G/DL (ref 6.6–8.7)
RBC # BLD AUTO: 3.8 M/UL (ref 4.21–5.77)
SODIUM SERPL-SCNC: 135 MMOL/L (ref 136–145)
WBC OTHER # BLD: 7.7 K/UL (ref 3.5–11.3)

## 2024-07-02 PROCEDURE — 80053 COMPREHEN METABOLIC PANEL: CPT

## 2024-07-02 PROCEDURE — 85027 COMPLETE CBC AUTOMATED: CPT

## 2024-07-02 PROCEDURE — 36415 COLL VENOUS BLD VENIPUNCTURE: CPT

## 2024-07-02 PROCEDURE — P9603 ONE-WAY ALLOW PRORATED MILES: HCPCS

## 2024-08-27 ENCOUNTER — OFFICE VISIT (OUTPATIENT)
Dept: ONCOLOGY | Age: 89
End: 2024-08-27
Payer: COMMERCIAL

## 2024-08-27 ENCOUNTER — TELEPHONE (OUTPATIENT)
Dept: ONCOLOGY | Age: 89
End: 2024-08-27

## 2024-08-27 VITALS
TEMPERATURE: 96.8 F | BODY MASS INDEX: 24.18 KG/M2 | WEIGHT: 159 LBS | HEART RATE: 59 BPM | DIASTOLIC BLOOD PRESSURE: 68 MMHG | SYSTOLIC BLOOD PRESSURE: 126 MMHG

## 2024-08-27 DIAGNOSIS — Z85.820 HISTORY OF MELANOMA: ICD-10-CM

## 2024-08-27 DIAGNOSIS — C88.4 MALT LYMPHOMA (HCC): ICD-10-CM

## 2024-08-27 DIAGNOSIS — D64.9 NORMOCYTIC ANEMIA: Primary | ICD-10-CM

## 2024-08-27 DIAGNOSIS — R59.0 AXILLARY LYMPHADENOPATHY: ICD-10-CM

## 2024-08-27 DIAGNOSIS — Z85.46 HISTORY OF PROSTATE CANCER: ICD-10-CM

## 2024-08-27 DIAGNOSIS — Z85.51 HISTORY OF BLADDER CANCER: ICD-10-CM

## 2024-08-27 PROCEDURE — 1123F ACP DISCUSS/DSCN MKR DOCD: CPT | Performed by: INTERNAL MEDICINE

## 2024-08-27 PROCEDURE — 99214 OFFICE O/P EST MOD 30 MIN: CPT | Performed by: INTERNAL MEDICINE

## 2024-08-27 NOTE — PROGRESS NOTES
MCV 96.3 07/02/2024     07/02/2024    LYMPHOPCT 14 (L) 03/01/2024    RBC 3.80 (L) 07/02/2024    MCH 31.8 07/02/2024    MCHC 33.1 07/02/2024    RDW 14.3 07/02/2024    NEUTOPHILPCT 73 (H) 03/01/2024    MONOPCT 9 (H) 03/01/2024    EOSPCT 3 03/01/2024    BASOPCT 1 03/01/2024    NEUTROABS 5.90 03/01/2024    LYMPHSABS 1.10 03/01/2024    MONOSABS 0.70 03/01/2024    EOSABS 0.20 03/01/2024    BASOSABS 0.00 03/01/2024         Chemistry        Component Value Date/Time     (L) 07/02/2024 0545    K 3.8 07/02/2024 0545     07/02/2024 0545    CO2 24 07/02/2024 0545    BUN 23 07/02/2024 0545    CREATININE 1.0 07/02/2024 0545        Component Value Date/Time    CALCIUM 8.8 07/02/2024 0545    ALKPHOS 391 (H) 07/02/2024 0545    AST 29 07/02/2024 0545    ALT 11 07/02/2024 0545    BILITOT 0.9 07/02/2024 0545            PATHOLOGY DATA:         IMAGING DATA:      PET CT SKULL BASE TO MID THIGH  Narrative: EXAMINATION:  Skull base to midthigh PET/CT    2/7/2023    TECHNIQUE:  Following IV injection of 7.62 mCi of F-18 FDG, PET  tumor imaging was  acquired from the base of the skull to the mid thighs.  Computed tomography  was used for purposes of attenuation correction and anatomic localization.  Fusion imaging was utilized for interpretation.    Uptake time 51 min.  Glucose level 106 mg/dl.    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    CHEST: Extensive bilateral and relatively symmetric activity is seen  localizing to fat in bilateral paraspinal regions, compatible with brown fat.  Calcified mediastinal and right hilar lymph nodes, in keeping with  granulomatous disease.  Calcification of the thoracic aorta.  Coronary artery  calcification.  Within          [facility]                            This note is created with the assistance of a speech recognition program.  While intending to generate a document that actually reflects the content of the visit, the document can still have some errors including those of syntax and sound a like substitutions which may escape proof reading.  It such instances, actual meaning can be extrapolated by contextual diversion.

## 2024-08-27 NOTE — TELEPHONE ENCOUNTER
AVS 08/27/24    Rtc in 4 months with labs    Labs ordered today  Erythropoietin  Complete this on or around 8/27/2024.  Ferritin  Complete this on or around 8/27/2024.  Iron and TIBC  Complete this on or around 8/27/2024.  Lactate Dehydrogenase  Complete this on or around 8/27/2024.  Path Review, Smear  Complete this on or around 8/27/2024.  Reticulocytes  Complete this on or around 8/27/2024.  Vitamin B12 & Folate  Complete this on or around 8/27/2024.  CBC with Auto Differential  Complete this on or around 9/3/2024.  Comprehensive Metabolic Panel  Complete this on or around 9/3/2024.    RV 12/17/24    Labs to be done week prior    PT was given AVS and appt schedule    Electronically signed by Selene Narayan on 8/27/2024 at 10:31 AM

## 2024-09-09 ENCOUNTER — OFFICE VISIT (OUTPATIENT)
Dept: PODIATRY | Age: 89
End: 2024-09-09

## 2024-09-09 VITALS — HEIGHT: 68 IN | BODY MASS INDEX: 24.1 KG/M2 | WEIGHT: 159 LBS

## 2024-09-09 DIAGNOSIS — I73.9 PERIPHERAL ARTERIAL DISEASE (HCC): ICD-10-CM

## 2024-09-09 DIAGNOSIS — M79.675 PAIN OF TOES OF BOTH FEET: ICD-10-CM

## 2024-09-09 DIAGNOSIS — M79.674 PAIN OF TOES OF BOTH FEET: ICD-10-CM

## 2024-09-09 DIAGNOSIS — B35.1 ONYCHOMYCOSIS OF TOENAIL: Primary | ICD-10-CM

## 2024-09-23 ENCOUNTER — HOSPITAL ENCOUNTER (OUTPATIENT)
Age: 89
Setting detail: SPECIMEN
Discharge: HOME | End: 2024-09-23

## 2024-09-23 DIAGNOSIS — E78.00 PURE HYPERCHOLESTEROLEMIA: ICD-10-CM

## 2024-09-23 LAB
CHOLEST SERPL-MCNC: 156 MG/DL (ref 0–199)
CHOLESTEROL/HDL RATIO: 2
HDLC SERPL-MCNC: 77 MG/DL
LDLC SERPL CALC-MCNC: 66 MG/DL (ref 0–100)
TRIGL SERPL-MCNC: 70 MG/DL (ref 0–149)
VLDLC SERPL CALC-MCNC: 14 MG/DL

## 2024-10-31 ENCOUNTER — HOSPITAL ENCOUNTER (OUTPATIENT)
Age: 89
Discharge: HOME | End: 2024-10-31
Payer: COMMERCIAL

## 2024-10-31 DIAGNOSIS — D64.9 NORMOCYTIC ANEMIA: ICD-10-CM

## 2024-10-31 LAB
ALBUMIN SERPL-MCNC: 3.9 G/DL (ref 3.5–5.2)
ALP SERPL-CCNC: 161 U/L (ref 40–129)
ALT SERPL-CCNC: 16 U/L (ref 10–50)
ANION GAP SERPL CALCULATED.3IONS-SCNC: 9 MMOL/L (ref 9–16)
AST SERPL-CCNC: 27 U/L (ref 10–50)
BASOPHILS # BLD: 0.1 K/UL (ref 0–0.2)
BASOPHILS NFR BLD: 1 % (ref 0–2)
BILIRUB SERPL-MCNC: 1 MG/DL (ref 0–1.2)
BUN SERPL-MCNC: 27 MG/DL (ref 8–23)
CALCIUM SERPL-MCNC: 9 MG/DL (ref 8.6–10.4)
CHLORIDE SERPL-SCNC: 104 MMOL/L (ref 98–107)
CO2 SERPL-SCNC: 26 MMOL/L (ref 20–31)
CREAT SERPL-MCNC: 1.6 MG/DL (ref 0.7–1.2)
EOSINOPHIL # BLD: 0.2 K/UL (ref 0–0.4)
EOSINOPHILS RELATIVE PERCENT: 3 % (ref 0–4)
ERYTHROCYTE [DISTWIDTH] IN BLOOD BY AUTOMATED COUNT: 14.9 % (ref 11.5–14.9)
FERRITIN SERPL-MCNC: 451 NG/ML
FOLATE SERPL-MCNC: 12.5 NG/ML (ref 4.8–24.2)
GFR, ESTIMATED: 40 ML/MIN/1.73M2
GLUCOSE SERPL-MCNC: 111 MG/DL (ref 74–99)
HCT VFR BLD AUTO: 40.9 % (ref 41–53)
HGB BLD-MCNC: 13.9 G/DL (ref 13.5–17.5)
IRON SATN MFR SERPL: 44 % (ref 20–55)
IRON SERPL-MCNC: 85 UG/DL (ref 61–157)
LDH SERPL-CCNC: 191 U/L (ref 135–225)
LYMPHOCYTES NFR BLD: 1.4 K/UL (ref 1–4.8)
LYMPHOCYTES RELATIVE PERCENT: 17 % (ref 24–44)
MCH RBC QN AUTO: 32.9 PG (ref 26–34)
MCHC RBC AUTO-ENTMCNC: 33.9 G/DL (ref 31–37)
MCV RBC AUTO: 97 FL (ref 80–100)
MONOCYTES NFR BLD: 0.7 K/UL (ref 0.1–1.3)
MONOCYTES NFR BLD: 9 % (ref 1–7)
NEUTROPHILS NFR BLD: 70 % (ref 36–66)
NEUTS SEG NFR BLD: 5.6 K/UL (ref 1.3–9.1)
PLATELET # BLD AUTO: 195 K/UL (ref 150–450)
PMV BLD AUTO: 9.2 FL (ref 6–12)
POTASSIUM SERPL-SCNC: 4.1 MMOL/L (ref 3.7–5.3)
PROT SERPL-MCNC: 6.2 G/DL (ref 6.6–8.7)
RBC # BLD AUTO: 4.21 M/UL (ref 4.5–5.9)
RETICS # AUTO: 0.02 M/UL (ref 0.02–0.1)
RETICS/RBC NFR AUTO: 0.5 % (ref 0.5–2)
SODIUM SERPL-SCNC: 139 MMOL/L (ref 136–145)
TIBC SERPL-MCNC: 192 UG/DL (ref 250–450)
UNSATURATED IRON BINDING CAPACITY: 107 UG/DL (ref 112–347)
VIT B12 SERPL-MCNC: 580 PG/ML (ref 232–1245)
WBC OTHER # BLD: 8 K/UL (ref 3.5–11)

## 2024-10-31 PROCEDURE — 82728 ASSAY OF FERRITIN: CPT

## 2024-10-31 PROCEDURE — 36415 COLL VENOUS BLD VENIPUNCTURE: CPT

## 2024-10-31 PROCEDURE — 83550 IRON BINDING TEST: CPT

## 2024-10-31 PROCEDURE — 83615 LACTATE (LD) (LDH) ENZYME: CPT

## 2024-10-31 PROCEDURE — 80053 COMPREHEN METABOLIC PANEL: CPT

## 2024-10-31 PROCEDURE — 82668 ASSAY OF ERYTHROPOIETIN: CPT

## 2024-10-31 PROCEDURE — 82607 VITAMIN B-12: CPT

## 2024-10-31 PROCEDURE — 85045 AUTOMATED RETICULOCYTE COUNT: CPT

## 2024-10-31 PROCEDURE — 85025 COMPLETE CBC W/AUTO DIFF WBC: CPT

## 2024-10-31 PROCEDURE — 82746 ASSAY OF FOLIC ACID SERUM: CPT

## 2024-10-31 PROCEDURE — 83540 ASSAY OF IRON: CPT

## 2024-11-01 LAB
EPO SERPL-ACNC: 12 MU/ML (ref 4–27)
PATH REV BLD -IMP: NORMAL
SURGICAL PATHOLOGY REPORT: NORMAL

## 2024-12-02 ENCOUNTER — TELEPHONE (OUTPATIENT)
Dept: ONCOLOGY | Age: 88
End: 2024-12-02

## 2024-12-02 NOTE — TELEPHONE ENCOUNTER
Office received call from kendrick's wife letting us know she would need to cancel his next appt. Pt fell and is in a rehab facility and she is not sure  when he will be able to go home she will call back when she finds out more on his return home     Electronically signed by Breanna Conner on 12/2/2024 at 9:15 AM

## 2025-02-07 PROBLEM — I71.20 THORACIC AORTIC ANEURYSM (TAA): Status: ACTIVE | Noted: 2019-11-19

## 2025-02-07 PROBLEM — C80.1 MALIGNANT NEOPLASM (HCC): Status: ACTIVE | Noted: 2025-02-07

## 2025-02-07 PROBLEM — C43.30 MALIGNANT MELANOMA OF FACE EXCLUDING EYELID, NOSE, LIP, AND EAR (HCC): Status: ACTIVE | Noted: 2020-12-18

## 2025-02-07 PROBLEM — H65.33 CHRONIC MUCOID OTITIS MEDIA OF BOTH EARS: Status: ACTIVE | Noted: 2024-03-27

## 2025-02-07 PROBLEM — K92.2 GASTROINTESTINAL HEMORRHAGE: Status: ACTIVE | Noted: 2022-01-27

## 2025-02-07 PROBLEM — I48.91 ATRIAL FIBRILLATION (HCC): Status: ACTIVE | Noted: 2022-10-04

## 2025-02-07 PROBLEM — R29.6 MULTIPLE FALLS: Status: ACTIVE | Noted: 2024-06-30

## 2025-02-07 PROBLEM — M17.12 OSTEOARTHRITIS OF LEFT KNEE: Status: ACTIVE | Noted: 2019-04-26

## 2025-02-07 PROBLEM — I25.10 ATHEROSCLEROTIC HEART DISEASE OF NATIVE CORONARY ARTERY WITHOUT ANGINA PECTORIS: Status: ACTIVE | Noted: 2021-10-11

## 2025-02-07 PROBLEM — E87.1 HYPONATREMIA: Status: ACTIVE | Noted: 2022-01-29

## 2025-02-07 PROBLEM — I44.0 FIRST DEGREE ATRIOVENTRICULAR BLOCK: Status: ACTIVE | Noted: 2019-11-19

## 2025-02-07 PROBLEM — I51.7 LEFT VENTRICULAR HYPERTROPHY: Status: ACTIVE | Noted: 2019-11-27

## 2025-02-07 PROBLEM — I35.1 MODERATE AORTIC REGURGITATION: Status: ACTIVE | Noted: 2019-11-27

## 2025-02-07 PROBLEM — T85.898A VENTILATION TUBE BLOCKED: Status: ACTIVE | Noted: 2019-05-20

## 2025-02-07 PROBLEM — C49.0: Status: ACTIVE | Noted: 2020-12-17

## 2025-02-07 PROBLEM — H72.01 CENTRAL PERFORATION OF TYMPANIC MEMBRANE, RIGHT: Status: ACTIVE | Noted: 2018-10-16

## 2025-02-07 PROBLEM — H26.9 CATARACT: Status: ACTIVE | Noted: 2025-02-07

## 2025-02-07 PROBLEM — R44.9: Status: ACTIVE | Noted: 2017-03-23

## 2025-02-07 PROBLEM — H91.90 HEARING LOSS: Status: ACTIVE | Noted: 2024-08-28

## 2025-03-03 ENCOUNTER — TELEPHONE (OUTPATIENT)
Dept: ONCOLOGY | Age: 89
End: 2025-03-03

## 2025-03-03 NOTE — TELEPHONE ENCOUNTER
**SPOKE WITH PT WIFE, PT AWARE OF APPT**     Electronically signed by Selene Narayan on 3/3/2025 at 12:38 PM

## 2025-03-07 ENCOUNTER — OFFICE VISIT (OUTPATIENT)
Dept: ONCOLOGY | Age: 89
End: 2025-03-07
Payer: COMMERCIAL

## 2025-03-07 ENCOUNTER — TELEPHONE (OUTPATIENT)
Dept: ONCOLOGY | Age: 89
End: 2025-03-07

## 2025-03-07 VITALS
HEART RATE: 57 BPM | WEIGHT: 161 LBS | DIASTOLIC BLOOD PRESSURE: 79 MMHG | SYSTOLIC BLOOD PRESSURE: 137 MMHG | RESPIRATION RATE: 18 BRPM | TEMPERATURE: 97.3 F | BODY MASS INDEX: 24.48 KG/M2

## 2025-03-07 DIAGNOSIS — C88.40 MALT LYMPHOMA: Primary | ICD-10-CM

## 2025-03-07 DIAGNOSIS — C67.9 MALIGNANT NEOPLASM OF URINARY BLADDER, UNSPECIFIED SITE (HCC): ICD-10-CM

## 2025-03-07 DIAGNOSIS — I48.20 ATRIAL FIBRILLATION, CHRONIC (HCC): ICD-10-CM

## 2025-03-07 DIAGNOSIS — Z85.820 HISTORY OF MELANOMA: ICD-10-CM

## 2025-03-07 DIAGNOSIS — Z85.46 HISTORY OF PROSTATE CANCER: ICD-10-CM

## 2025-03-07 PROCEDURE — 99211 OFF/OP EST MAY X REQ PHY/QHP: CPT | Performed by: INTERNAL MEDICINE

## 2025-03-07 PROCEDURE — 1159F MED LIST DOCD IN RCRD: CPT | Performed by: INTERNAL MEDICINE

## 2025-03-07 PROCEDURE — 99214 OFFICE O/P EST MOD 30 MIN: CPT | Performed by: INTERNAL MEDICINE

## 2025-03-07 PROCEDURE — 1123F ACP DISCUSS/DSCN MKR DOCD: CPT | Performed by: INTERNAL MEDICINE

## 2025-03-07 PROCEDURE — 1126F AMNT PAIN NOTED NONE PRSNT: CPT | Performed by: INTERNAL MEDICINE

## 2025-03-07 NOTE — TELEPHONE ENCOUNTER
AVS 03/07/25    Instructions   from Dr. Carlos Alberto Conde MD    Rtc in 6 months with labs    Labs ordered today  CBC with Auto Differential  Complete this on or around 3/14/2025.  Comprehensive Metabolic Panel  Complete this on or around 3/14/2025.      RV 09/12//25    Labs to be done week prior    PT was given AVS and appt schedule    Electronically signed by Selene Narayan on 3/7/2025 at 11:56 AM

## 2025-03-07 NOTE — PROGRESS NOTES
limits        Interim history  Patient presents to the clinic for a follow-up visit and to discuss results of his lab work-up and other relevant clinical data.  Overall patient has been tolerating treatment without unexpected or severe side effects.    During this visit patient's allergy, social, medical, surgical history and medications were reviewed and updated.     Patient had no epigastric pain   Hemoglobin improved to 13  I can feel a left axillary lymph node slightly bigger  Still fatigue and sleep a lot  No evidence of recurrence      Past Medical History:   Diagnosis Date    Bilateral edema of lower extremity 1995    Bilateral hearing loss     wears hearing aids    Diverticulosis of sigmoid colon     Gout     History of bladder cancer 1980    Dr. Valero/has yearly cysto    History of colon polyps 09/10/2012    History of hypokalemia     History of melanoma 2008    left cheek    History of prostate cancer 1992    S/P Prostatectomy    Hyperlipidemia     Hypertension     Tubular adenoma of colon 09/10/2012       Past Surgical History:   Procedure Laterality Date    COLONOSCOPY  2005    dr richardson    COLONOSCOPY  09/10/2012    significant diverticulosis, tubular adenoma-sigmoid colon, small hemorrhoids    COLONOSCOPY  10/03/2016    significant diverticulosis sigmoid colon, small polyp rectum-hyperplastic    EYE SURGERY Bilateral 1994    cataracts removed    HERNIA REPAIR Right 1956    inguinal    HERNIA REPAIR Left 1995    inguinal    JOINT REPLACEMENT Right 1984    great toe joint D/T gout    KNEE ARTHROPLASTY Left 2000    meniscal repair    PROSTATE SURGERY  2002    UPPER GASTROINTESTINAL ENDOSCOPY N/A 10/14/2022    EGD BIOPSY performed by Rehan Thomas MD at [facility]       Allergies   Allergen Reactions    Mirabegron      Elevated blood pressure       Current Outpatient Medications   Medication Sig Dispense Refill    QUEtiapine (SEROQUEL) 25 MG tablet Take 1 tablet by mouth nightly      amLODIPine

## 2025-03-25 ENCOUNTER — HOSPITAL ENCOUNTER (OUTPATIENT)
Age: 89
Discharge: HOME | End: 2025-03-25
Payer: COMMERCIAL

## 2025-03-25 DIAGNOSIS — C88.40 MALT LYMPHOMA: ICD-10-CM

## 2025-03-25 LAB
ALBUMIN SERPL-MCNC: 3.7 G/DL (ref 3.5–5.2)
ALP SERPL-CCNC: 168 U/L (ref 40–129)
ALT SERPL-CCNC: 17 U/L (ref 10–50)
ANION GAP SERPL CALCULATED.3IONS-SCNC: 12 MMOL/L (ref 9–16)
AST SERPL-CCNC: 30 U/L (ref 10–50)
BASOPHILS # BLD: 0.1 K/UL (ref 0–0.2)
BASOPHILS NFR BLD: 1 % (ref 0–2)
BILIRUB SERPL-MCNC: 0.8 MG/DL (ref 0–1.2)
BUN SERPL-MCNC: 46 MG/DL (ref 8–23)
CALCIUM SERPL-MCNC: 9.3 MG/DL (ref 8.6–10.4)
CHLORIDE SERPL-SCNC: 105 MMOL/L (ref 98–107)
CO2 SERPL-SCNC: 23 MMOL/L (ref 20–31)
CREAT SERPL-MCNC: 1.7 MG/DL (ref 0.7–1.2)
EOSINOPHIL # BLD: 0.3 K/UL (ref 0–0.4)
EOSINOPHILS RELATIVE PERCENT: 3 % (ref 0–4)
ERYTHROCYTE [DISTWIDTH] IN BLOOD BY AUTOMATED COUNT: 14.9 % (ref 11.5–14.9)
GFR, ESTIMATED: 38 ML/MIN/1.73M2
GLUCOSE SERPL-MCNC: 121 MG/DL (ref 74–99)
HCT VFR BLD AUTO: 42.5 % (ref 41–53)
HGB BLD-MCNC: 14.1 G/DL (ref 13.5–17.5)
LYMPHOCYTES NFR BLD: 1.3 K/UL (ref 1–4.8)
LYMPHOCYTES RELATIVE PERCENT: 15 % (ref 24–44)
MCH RBC QN AUTO: 32.1 PG (ref 26–34)
MCHC RBC AUTO-ENTMCNC: 33.1 G/DL (ref 31–37)
MCV RBC AUTO: 96.8 FL (ref 80–100)
MONOCYTES NFR BLD: 0.8 K/UL (ref 0.1–1.3)
MONOCYTES NFR BLD: 9 % (ref 1–7)
NEUTROPHILS NFR BLD: 72 % (ref 36–66)
NEUTS SEG NFR BLD: 6 K/UL (ref 1.3–9.1)
PLATELET # BLD AUTO: 178 K/UL (ref 150–450)
PMV BLD AUTO: 9 FL (ref 6–12)
POTASSIUM SERPL-SCNC: 4 MMOL/L (ref 3.7–5.3)
PROT SERPL-MCNC: 6.5 G/DL (ref 6.6–8.7)
RBC # BLD AUTO: 4.39 M/UL (ref 4.5–5.9)
SODIUM SERPL-SCNC: 140 MMOL/L (ref 136–145)
WBC OTHER # BLD: 8.4 K/UL (ref 3.5–11)

## 2025-03-25 PROCEDURE — 80053 COMPREHEN METABOLIC PANEL: CPT

## 2025-03-25 PROCEDURE — 36415 COLL VENOUS BLD VENIPUNCTURE: CPT

## 2025-03-25 PROCEDURE — 85025 COMPLETE CBC W/AUTO DIFF WBC: CPT

## 2025-04-24 ENCOUNTER — OFFICE VISIT (OUTPATIENT)
Dept: PODIATRY | Age: 89
End: 2025-04-24
Payer: COMMERCIAL

## 2025-04-24 VITALS — HEIGHT: 68 IN | WEIGHT: 160 LBS | BODY MASS INDEX: 24.25 KG/M2

## 2025-04-24 DIAGNOSIS — B35.1 ONYCHOMYCOSIS OF TOENAIL: Primary | ICD-10-CM

## 2025-04-24 DIAGNOSIS — M79.675 PAIN OF TOES OF BOTH FEET: ICD-10-CM

## 2025-04-24 DIAGNOSIS — I73.9 PERIPHERAL ARTERIAL DISEASE: ICD-10-CM

## 2025-04-24 DIAGNOSIS — M79.674 PAIN OF TOES OF BOTH FEET: ICD-10-CM

## 2025-04-24 PROCEDURE — 11721 DEBRIDE NAIL 6 OR MORE: CPT | Performed by: PODIATRIST

## 2025-04-24 PROCEDURE — 99999 PR OFFICE/OUTPT VISIT,PROCEDURE ONLY: CPT | Performed by: PODIATRIST

## 2025-04-28 ASSESSMENT — ENCOUNTER SYMPTOMS
SHORTNESS OF BREATH: 0
DIARRHEA: 0
BACK PAIN: 0
COLOR CHANGE: 0
NAUSEA: 0

## 2025-04-28 NOTE — PROGRESS NOTES
SUBJECTIVE: Murali Munoz is a 89 y.o. male who returns to the office with chief complaint of painful fungal toenails. Patient relates toe nails are thickened/difficult to trim as well as painful with ambulation and with shoe gear.   Chief Complaint   Patient presents with    Nail Problem     Nail trim/last saw Dr.Tanya Paulino 2/10/25     Review of Systems   Constitutional:  Negative for activity change, appetite change, chills, diaphoresis, fatigue and fever.   Respiratory:  Negative for shortness of breath.    Cardiovascular:  Negative for leg swelling.   Gastrointestinal:  Negative for diarrhea and nausea.   Endocrine: Negative for cold intolerance, heat intolerance and polyuria.   Musculoskeletal:  Positive for arthralgias. Negative for back pain, gait problem, joint swelling and myalgias.   Skin:  Negative for color change, pallor, rash and wound.   Allergic/Immunologic: Negative for environmental allergies and food allergies.   Neurological:  Negative for dizziness, weakness, light-headedness and numbness.   Hematological:  Does not bruise/bleed easily.   Psychiatric/Behavioral:  Negative for behavioral problems, confusion and self-injury. The patient is not nervous/anxious.      OBJECTIVE: Clinical evaluation of patient reveals nails 1,2,3,4,5 of the right foot and nails 1,2,3,4,5 of the left foot to present with thickness, elongation, discoloration, brittleness, and subungual debris. There was pain with palpation and debridement of the toenails of the bilateral feet. No open lesions noted to either foot today.   The right DP pulse is not palpable.   The left DP pulse is not palpable.   The right PT pulse is not palpable.   The left PT pulse is not palpable.     Class A Findings (1 needed)   [] Non-traumatic amputation of foot or integral skeleton portion thereof.   [] Q7.      Class B Findings (2 needed)   1. [x] Absent posterior tibial pulse   2. [x] Absent dorsalis pedis pulse   3. [] Advanced trophic

## 2025-07-24 ENCOUNTER — OFFICE VISIT (OUTPATIENT)
Dept: PODIATRY | Age: 89
End: 2025-07-24

## 2025-07-24 VITALS — WEIGHT: 155 LBS | HEIGHT: 68 IN | BODY MASS INDEX: 23.49 KG/M2

## 2025-07-24 DIAGNOSIS — B35.1 ONYCHOMYCOSIS OF TOENAIL: Primary | ICD-10-CM

## 2025-07-24 DIAGNOSIS — M79.675 PAIN OF TOES OF BOTH FEET: ICD-10-CM

## 2025-07-24 DIAGNOSIS — I73.9 PERIPHERAL ARTERIAL DISEASE: ICD-10-CM

## 2025-07-24 DIAGNOSIS — M79.674 PAIN OF TOES OF BOTH FEET: ICD-10-CM

## 2025-07-24 RX ORDER — HYDRALAZINE HYDROCHLORIDE 25 MG/1
TABLET, FILM COATED ORAL
COMMUNITY
Start: 2025-07-23

## 2025-07-24 ASSESSMENT — ENCOUNTER SYMPTOMS
DIARRHEA: 0
NAUSEA: 0
COLOR CHANGE: 0
SHORTNESS OF BREATH: 0

## 2025-07-24 NOTE — PROGRESS NOTES
CELECOXIB 200MG CAPSULES    Please see pended medications. Please sign if appropriate.       Thank you      Last OV: 12/13/2022      Last refill: 06/10/2022 for 90/1 refills    Pt advised to RTC in June for LINDSEY SUBJECTIVE: Murali Munoz is a 89 y.o. male who returns to the office with chief complaint of painful fungal toenails. Patient relates toe nails are thickened/difficult to trim as well as painful with ambulation and with shoe gear.   Chief Complaint   Patient presents with    Nail Problem     B/l nail trim, last seen Olivia Paulino MD 6/10/25     Review of Systems   Constitutional:  Negative for activity change, appetite change, chills, diaphoresis, fatigue and fever.   HENT:  Positive for hearing loss.    Respiratory:  Negative for shortness of breath.    Cardiovascular:  Negative for leg swelling.   Gastrointestinal:  Negative for diarrhea and nausea.   Endocrine: Negative for cold intolerance, heat intolerance and polyuria.   Musculoskeletal:  Positive for arthralgias. Negative for back pain, gait problem, joint swelling and myalgias.   Skin:  Negative for color change, pallor, rash and wound.   Allergic/Immunologic: Negative for environmental allergies and food allergies.   Neurological:  Negative for dizziness, weakness, light-headedness and numbness.   Hematological:  Does not bruise/bleed easily.   Psychiatric/Behavioral:  Negative for behavioral problems, confusion and self-injury. The patient is not nervous/anxious.      OBJECTIVE: Clinical evaluation of patient reveals nails 1,2,3,4,5 of the right foot and nails 1,2,3,4,5 of the left foot to present with thickness, elongation, discoloration, brittleness, and subungual debris. There was pain with palpation and debridement of the toenails of the bilateral feet. No open lesions noted to either foot today.   The right DP pulse is not palpable.   The left DP pulse is not palpable.   The right PT pulse is not palpable.   The left PT pulse is not palpable.     Class A Findings (1 needed)   [] Non-traumatic amputation of foot or integral skeleton portion thereof.   [] Q7.      Class B Findings (2 needed)   1. [x] Absent posterior tibial pulse   2. [x] Absent

## 2025-09-04 ENCOUNTER — HOSPITAL ENCOUNTER (OUTPATIENT)
Dept: LAB | Age: 89
Discharge: HOME | End: 2025-09-04
Payer: COMMERCIAL

## 2025-09-04 ENCOUNTER — TELEPHONE (OUTPATIENT)
Age: 89
End: 2025-09-04

## 2025-09-04 DIAGNOSIS — D64.9 NORMOCYTIC ANEMIA: Primary | ICD-10-CM

## 2025-09-04 DIAGNOSIS — Z85.820 HISTORY OF MELANOMA: ICD-10-CM

## 2025-09-04 DIAGNOSIS — D64.9 NORMOCYTIC ANEMIA: ICD-10-CM

## 2025-09-04 LAB
ALBUMIN SERPL-MCNC: 3.8 G/DL (ref 3.5–5.2)
ALP SERPL-CCNC: 139 U/L (ref 40–129)
ALT SERPL-CCNC: 17 U/L (ref 10–50)
ANION GAP SERPL CALCULATED.3IONS-SCNC: 11 MMOL/L (ref 9–16)
AST SERPL-CCNC: 28 U/L (ref 10–50)
BASOPHILS # BLD: 0.09 K/UL (ref 0–0.2)
BASOPHILS NFR BLD: 1 % (ref 0–2)
BILIRUB SERPL-MCNC: 1 MG/DL (ref 0–1.2)
BUN SERPL-MCNC: 37 MG/DL (ref 8–23)
CALCIUM SERPL-MCNC: 9 MG/DL (ref 8.6–10.4)
CHLORIDE SERPL-SCNC: 105 MMOL/L (ref 98–107)
CO2 SERPL-SCNC: 22 MMOL/L (ref 20–31)
CREAT SERPL-MCNC: 1.6 MG/DL (ref 0.7–1.2)
EOSINOPHIL # BLD: 0.55 K/UL (ref 0–0.44)
EOSINOPHILS RELATIVE PERCENT: 6 % (ref 0–4)
ERYTHROCYTE [DISTWIDTH] IN BLOOD BY AUTOMATED COUNT: 13.9 % (ref 11.5–14.9)
GFR, ESTIMATED: 40 ML/MIN/1.73M2
GLUCOSE SERPL-MCNC: 89 MG/DL (ref 74–99)
HCT VFR BLD AUTO: 36.5 % (ref 41–53)
HGB BLD-MCNC: 12.2 G/DL (ref 13.5–17.5)
IMM GRANULOCYTES # BLD AUTO: 0.06 K/UL (ref 0–0.3)
IMM GRANULOCYTES NFR BLD: 1 %
LYMPHOCYTES NFR BLD: 1.4 K/UL (ref 1.1–3.7)
LYMPHOCYTES RELATIVE PERCENT: 16 % (ref 24–44)
MCH RBC QN AUTO: 32.5 PG (ref 26–34)
MCHC RBC AUTO-ENTMCNC: 33.4 G/DL (ref 31–37)
MCV RBC AUTO: 97.3 FL (ref 80–100)
MONOCYTES NFR BLD: 0.97 K/UL (ref 0.1–1.2)
MONOCYTES NFR BLD: 11 % (ref 3–12)
NEUTROPHILS NFR BLD: 65 % (ref 36–66)
NEUTS SEG NFR BLD: 5.69 K/UL (ref 1.5–8.1)
NRBC BLD-RTO: 0 PER 100 WBC
PLATELET # BLD AUTO: 169 K/UL (ref 150–450)
PMV BLD AUTO: 11.3 FL (ref 8–13.5)
POTASSIUM SERPL-SCNC: 4.8 MMOL/L (ref 3.7–5.3)
PROT SERPL-MCNC: 6.2 G/DL (ref 6.6–8.7)
RBC # BLD AUTO: 3.75 M/UL (ref 4.21–5.77)
SODIUM SERPL-SCNC: 138 MMOL/L (ref 136–145)
WBC OTHER # BLD: 8.8 K/UL (ref 3.5–11)

## 2025-09-04 PROCEDURE — 80053 COMPREHEN METABOLIC PANEL: CPT

## 2025-09-04 PROCEDURE — 85025 COMPLETE CBC W/AUTO DIFF WBC: CPT

## 2025-09-04 PROCEDURE — 36415 COLL VENOUS BLD VENIPUNCTURE: CPT

## (undated) DEVICE — BITEBLOCK 54FR W/ DENT RIM BLOX

## (undated) DEVICE — GLOVE ORANGE PI 7   MSG9070

## (undated) DEVICE — DEFENDO AIR WATER SUCTION AND BIOPSY VALVE KIT FOR  OLYMPUS: Brand: DEFENDO AIR/WATER/SUCTION AND BIOPSY VALVE

## (undated) DEVICE — ENDO KIT W/SYRINGE: Brand: MEDLINE INDUSTRIES, INC.

## (undated) DEVICE — FORCEPS BX L240CM JAW DIA2.8MM L CAP W/ NDL MIC MESH TOOTH